# Patient Record
Sex: FEMALE | Race: WHITE | NOT HISPANIC OR LATINO | Employment: FULL TIME | URBAN - METROPOLITAN AREA
[De-identification: names, ages, dates, MRNs, and addresses within clinical notes are randomized per-mention and may not be internally consistent; named-entity substitution may affect disease eponyms.]

---

## 2023-03-23 LAB
CHLAMYDIA TRACH., AMPLIFIED: NEGATIVE
CLUE CELLS: NORMAL
N. GONORRHEA, AMPLIFIED: NEGATIVE
NUGENT SCORE: 1
YEAST: NORMAL

## 2023-06-15 LAB
BASOPHILS (10*3/UL) IN BLOOD BY AUTOMATED COUNT: 0.1 X10E9/L (ref 0–0.1)
BASOPHILS/100 LEUKOCYTES IN BLOOD BY AUTOMATED COUNT: 1 % (ref 0–2)
EOSINOPHILS (10*3/UL) IN BLOOD BY AUTOMATED COUNT: 0.58 X10E9/L (ref 0–0.7)
EOSINOPHILS/100 LEUKOCYTES IN BLOOD BY AUTOMATED COUNT: 6 % (ref 0–6)
ERYTHROCYTE DISTRIBUTION WIDTH (RATIO) BY AUTOMATED COUNT: 12.1 % (ref 11.5–14.5)
ERYTHROCYTE MEAN CORPUSCULAR HEMOGLOBIN CONCENTRATION (G/DL) BY AUTOMATED: 33.7 G/DL (ref 32–36)
ERYTHROCYTE MEAN CORPUSCULAR VOLUME (FL) BY AUTOMATED COUNT: 85 FL (ref 80–100)
ERYTHROCYTES (10*6/UL) IN BLOOD BY AUTOMATED COUNT: 4.8 X10E12/L (ref 4–5.2)
FERRITIN (UG/LL) IN SER/PLAS: 21 UG/L (ref 8–150)
HEMATOCRIT (%) IN BLOOD BY AUTOMATED COUNT: 40.6 % (ref 36–46)
HEMOGLOBIN (G/DL) IN BLOOD: 13.7 G/DL (ref 12–16)
IMMATURE GRANULOCYTES/100 LEUKOCYTES IN BLOOD BY AUTOMATED COUNT: 0.2 % (ref 0–0.9)
IRON (UG/DL) IN SER/PLAS: 114 UG/DL (ref 35–150)
IRON BINDING CAPACITY (UG/DL) IN SER/PLAS: 385 UG/DL (ref 240–445)
IRON SATURATION (%) IN SER/PLAS: 30 % (ref 25–45)
LEUKOCYTES (10*3/UL) IN BLOOD BY AUTOMATED COUNT: 9.6 X10E9/L (ref 4.4–11.3)
LYMPHOCYTES (10*3/UL) IN BLOOD BY AUTOMATED COUNT: 2.13 X10E9/L (ref 1.2–4.8)
LYMPHOCYTES/100 LEUKOCYTES IN BLOOD BY AUTOMATED COUNT: 22.2 % (ref 13–44)
MONOCYTES (10*3/UL) IN BLOOD BY AUTOMATED COUNT: 0.73 X10E9/L (ref 0.1–1)
MONOCYTES/100 LEUKOCYTES IN BLOOD BY AUTOMATED COUNT: 7.6 % (ref 2–10)
NEUTROPHILS (10*3/UL) IN BLOOD BY AUTOMATED COUNT: 6.03 X10E9/L (ref 1.2–7.7)
NEUTROPHILS/100 LEUKOCYTES IN BLOOD BY AUTOMATED COUNT: 63 % (ref 40–80)
PLATELETS (10*3/UL) IN BLOOD AUTOMATED COUNT: 329 X10E9/L (ref 150–450)

## 2023-06-16 LAB
DEAMIDATED GLIADIN PEPTIDE IGA: <1 U/ML (ref 0–14)
DEAMIDATED GLIADIN PEPTIDE IGG: <1 U/ML (ref 0–14)
HEPATITIS A VIRUS IGM AB PRESENCE IN SER/PLAS BY IMMUNOASSAY: NONREACTIVE
HEPATITIS B VIRUS CORE IGM AB PRESENCE IN SER/PLAS BY IMMUNOASSY: NONREACTIVE
HEPATITIS B VIRUS SURFACE AG PRESENCE IN SERUM: NONREACTIVE
HEPATITIS C VIRUS AB PRESENCE IN SERUM: NONREACTIVE
TISSUE TRANSGLUTAMINASE IGG: <1 U/ML (ref 0–14)
TISSUE TRANSGLUTAMINASE, IGA: <1 U/ML (ref 0–14)

## 2023-06-17 LAB
HEMOGLOBIN A2: 2.6 %
HEMOGLOBIN A: 96 %
HEMOGLOBIN F: 1.4 %
HEMOGLOBIN IDENTIFICATION INTERPRETATION: NORMAL
PATH REVIEW-HGB IDENTIFICATION: NORMAL

## 2023-06-19 LAB
ALBUMIN ELP: 5 G/DL (ref 3.4–5)
ALPHA 1: 0.3 G/DL (ref 0.2–0.6)
ALPHA 2: 0.7 G/DL (ref 0.4–1.1)
BETA: 0.8 G/DL (ref 0.5–1.2)
GAMMA GLOBULIN: 1.3 G/DL (ref 0.5–1.4)
PATH REVIEW-SERUM PROTEIN ELECTROPHORESIS: NORMAL
PROTEIN ELECTROPHORESIS INTERPRETATION: NORMAL
PROTEIN TOTAL: 8 G/DL (ref 6.4–8.2)

## 2023-09-25 LAB
ALANINE AMINOTRANSFERASE (SGPT) (U/L) IN SER/PLAS: 26 U/L (ref 7–45)
ALBUMIN (G/DL) IN SER/PLAS: 5 G/DL (ref 3.4–5)
ALKALINE PHOSPHATASE (U/L) IN SER/PLAS: 53 U/L (ref 33–110)
ANION GAP IN SER/PLAS: 11 MMOL/L (ref 10–20)
ASPARTATE AMINOTRANSFERASE (SGOT) (U/L) IN SER/PLAS: 19 U/L (ref 9–39)
BASOPHILS (10*3/UL) IN BLOOD BY AUTOMATED COUNT: 0.07 X10E9/L (ref 0–0.1)
BASOPHILS/100 LEUKOCYTES IN BLOOD BY AUTOMATED COUNT: 0.8 % (ref 0–2)
BILIRUBIN TOTAL (MG/DL) IN SER/PLAS: 0.6 MG/DL (ref 0–1.2)
CALCIUM (MG/DL) IN SER/PLAS: 9.9 MG/DL (ref 8.6–10.3)
CARBON DIOXIDE, TOTAL (MMOL/L) IN SER/PLAS: 32 MMOL/L (ref 21–32)
CHLORIDE (MMOL/L) IN SER/PLAS: 101 MMOL/L (ref 98–107)
CREATININE (MG/DL) IN SER/PLAS: 0.68 MG/DL (ref 0.5–1.05)
EOSINOPHILS (10*3/UL) IN BLOOD BY AUTOMATED COUNT: 0.28 X10E9/L (ref 0–0.7)
EOSINOPHILS/100 LEUKOCYTES IN BLOOD BY AUTOMATED COUNT: 3.3 % (ref 0–6)
ERYTHROCYTE DISTRIBUTION WIDTH (RATIO) BY AUTOMATED COUNT: 13.4 % (ref 11.5–14.5)
ERYTHROCYTE MEAN CORPUSCULAR HEMOGLOBIN CONCENTRATION (G/DL) BY AUTOMATED: 32.8 G/DL (ref 32–36)
ERYTHROCYTE MEAN CORPUSCULAR VOLUME (FL) BY AUTOMATED COUNT: 87 FL (ref 80–100)
ERYTHROCYTES (10*6/UL) IN BLOOD BY AUTOMATED COUNT: 4.91 X10E12/L (ref 4–5.2)
FERRITIN (UG/LL) IN SER/PLAS: 98 UG/L (ref 8–150)
GFR FEMALE: >90 ML/MIN/1.73M2
GLUCOSE (MG/DL) IN SER/PLAS: 87 MG/DL (ref 74–99)
HEMATOCRIT (%) IN BLOOD BY AUTOMATED COUNT: 42.7 % (ref 36–46)
HEMOGLOBIN (G/DL) IN BLOOD: 14 G/DL (ref 12–16)
HEMOGLOBIN (PG) IN RETICULOCYTES: 33 PG (ref 28–38)
IMMATURE GRANULOCYTES/100 LEUKOCYTES IN BLOOD BY AUTOMATED COUNT: 0.2 % (ref 0–0.9)
IMMATURE RETIC FRACTION: 4.9 % (ref 0–16)
IRON (UG/DL) IN SER/PLAS: 131 UG/DL (ref 35–150)
IRON BINDING CAPACITY (UG/DL) IN SER/PLAS: 329 UG/DL (ref 240–445)
IRON SATURATION (%) IN SER/PLAS: 40 % (ref 25–45)
LEUKOCYTES (10*3/UL) IN BLOOD BY AUTOMATED COUNT: 8.5 X10E9/L (ref 4.4–11.3)
LYMPHOCYTES (10*3/UL) IN BLOOD BY AUTOMATED COUNT: 3.02 X10E9/L (ref 1.2–4.8)
LYMPHOCYTES/100 LEUKOCYTES IN BLOOD BY AUTOMATED COUNT: 35.4 % (ref 13–44)
MONOCYTES (10*3/UL) IN BLOOD BY AUTOMATED COUNT: 0.56 X10E9/L (ref 0.1–1)
MONOCYTES/100 LEUKOCYTES IN BLOOD BY AUTOMATED COUNT: 6.6 % (ref 2–10)
NEUTROPHILS (10*3/UL) IN BLOOD BY AUTOMATED COUNT: 4.57 X10E9/L (ref 1.2–7.7)
NEUTROPHILS/100 LEUKOCYTES IN BLOOD BY AUTOMATED COUNT: 53.7 % (ref 40–80)
PLATELETS (10*3/UL) IN BLOOD AUTOMATED COUNT: 275 X10E9/L (ref 150–450)
POTASSIUM (MMOL/L) IN SER/PLAS: 3.5 MMOL/L (ref 3.5–5.3)
PROTEIN TOTAL: 8.1 G/DL (ref 6.4–8.2)
RETICULOCYTES (10*3/UL) IN BLOOD: 0.1 X10E12/L (ref 0.02–0.08)
RETICULOCYTES/100 ERYTHROCYTES IN BLOOD BY AUTOMATED COUNT: 2 % (ref 0.5–2)
SODIUM (MMOL/L) IN SER/PLAS: 140 MMOL/L (ref 136–145)
UREA NITROGEN (MG/DL) IN SER/PLAS: 10 MG/DL (ref 6–23)

## 2023-09-26 LAB
COBALAMIN (VITAMIN B12) (PG/ML) IN SER/PLAS: >2000 PG/ML (ref 211–911)
FOLATE (NG/ML) IN SER/PLAS: 16.6 NG/ML

## 2023-09-27 LAB — ERYTHROPOIETIN: 8 MU/ML (ref 4–27)

## 2023-10-06 PROBLEM — R10.813 RLQ ABDOMINAL TENDERNESS: Status: ACTIVE | Noted: 2023-10-06

## 2023-10-06 PROBLEM — R10.2 PELVIC PAIN IN FEMALE: Status: ACTIVE | Noted: 2023-10-06

## 2023-10-06 PROBLEM — S60.00XA: Status: ACTIVE | Noted: 2023-10-06

## 2023-10-06 RX ORDER — NORETHINDRONE 5 MG/1
1 TABLET ORAL DAILY
COMMUNITY
Start: 2020-02-20 | End: 2024-04-25 | Stop reason: WASHOUT

## 2023-10-06 RX ORDER — ONDANSETRON 4 MG/1
1 TABLET, FILM COATED ORAL EVERY 6 HOURS
COMMUNITY
Start: 2015-03-22

## 2023-10-06 RX ORDER — FAMOTIDINE 20 MG/1
TABLET, FILM COATED ORAL
COMMUNITY
Start: 2020-02-20

## 2023-10-06 RX ORDER — TRIAMCINOLONE ACETONIDE 5 MG/G
OINTMENT TOPICAL 2 TIMES DAILY
COMMUNITY
Start: 2020-02-20

## 2023-10-20 ENCOUNTER — LAB (OUTPATIENT)
Dept: LAB | Facility: LAB | Age: 23
End: 2023-10-20
Payer: COMMERCIAL

## 2023-10-20 DIAGNOSIS — D50.9 IRON DEFICIENCY ANEMIA, UNSPECIFIED IRON DEFICIENCY ANEMIA TYPE: ICD-10-CM

## 2023-10-20 DIAGNOSIS — K90.49 MALABSORPTION DUE TO INTOLERANCE, NOT ELSEWHERE CLASSIFIED: ICD-10-CM

## 2023-10-20 DIAGNOSIS — D50.8 IRON DEFICIENCY ANEMIA SECONDARY TO INADEQUATE DIETARY IRON INTAKE: ICD-10-CM

## 2023-10-20 DIAGNOSIS — K29.40 AUTOIMMUNE GASTRITIS: ICD-10-CM

## 2023-10-20 LAB
ALBUMIN SERPL BCP-MCNC: 4.8 G/DL (ref 3.4–5)
ALP SERPL-CCNC: 56 U/L (ref 33–110)
ALT SERPL W P-5'-P-CCNC: 23 U/L (ref 7–45)
ANION GAP SERPL CALC-SCNC: 11 MMOL/L (ref 10–20)
AST SERPL W P-5'-P-CCNC: 22 U/L (ref 9–39)
BASOPHILS # BLD AUTO: 0.07 X10*3/UL (ref 0–0.1)
BASOPHILS NFR BLD AUTO: 0.9 %
BILIRUB SERPL-MCNC: 0.6 MG/DL (ref 0–1.2)
BUN SERPL-MCNC: 11 MG/DL (ref 6–23)
CALCIUM SERPL-MCNC: 9.6 MG/DL (ref 8.6–10.3)
CHLORIDE SERPL-SCNC: 101 MMOL/L (ref 98–107)
CO2 SERPL-SCNC: 30 MMOL/L (ref 21–32)
CREAT SERPL-MCNC: 0.63 MG/DL (ref 0.5–1.05)
EOSINOPHIL # BLD AUTO: 0.29 X10*3/UL (ref 0–0.7)
EOSINOPHIL NFR BLD AUTO: 3.7 %
ERYTHROCYTE [DISTWIDTH] IN BLOOD BY AUTOMATED COUNT: 12.9 % (ref 11.5–14.5)
FERRITIN SERPL-MCNC: 76 NG/ML (ref 8–150)
GFR SERPL CREATININE-BSD FRML MDRD: >90 ML/MIN/1.73M*2
GLUCOSE SERPL-MCNC: 91 MG/DL (ref 74–99)
HCT VFR BLD AUTO: 40.3 % (ref 36–46)
HGB BLD-MCNC: 13.3 G/DL (ref 12–16)
IMM GRANULOCYTES # BLD AUTO: 0.07 X10*3/UL (ref 0–0.7)
IMM GRANULOCYTES NFR BLD AUTO: 0.1 % (ref 0–0.9)
IRON SATN MFR SERPL: 22 % (ref 25–45)
IRON SERPL-MCNC: 71 UG/DL (ref 35–150)
LYMPHOCYTES # BLD AUTO: 2.38 X10*3/UL (ref 1.2–4.8)
LYMPHOCYTES NFR BLD AUTO: 30.3 %
MCHC RBC AUTO-ENTMCNC: 33 G/DL (ref 32–36)
MCV RBC AUTO: 87 FL (ref 80–100)
MONOCYTES # BLD AUTO: 0.48 X10*3/UL (ref 0.1–1)
MONOCYTES NFR BLD AUTO: 6.1 %
NEUTROPHILS # BLD AUTO: 4.57 X10*3/UL (ref 1.2–7.7)
NEUTROPHILS NFR BLD AUTO: 58.1 %
PLATELET # BLD AUTO: 287 X10*3/UL (ref 150–450)
POTASSIUM SERPL-SCNC: 3.9 MMOL/L (ref 3.5–5.3)
PROT SERPL-MCNC: 7.6 G/DL (ref 6.4–8.2)
RBC # BLD AUTO: 4.63 X10*6/UL (ref 4–5.2)
SODIUM SERPL-SCNC: 138 MMOL/L (ref 136–145)
TIBC SERPL-MCNC: 320 UG/DL (ref 240–445)
UIBC SERPL-MCNC: 249 UG/DL (ref 110–370)
WBC # BLD AUTO: 7.9 X10*3/UL (ref 4.4–11.3)

## 2023-10-20 PROCEDURE — 82607 VITAMIN B-12: CPT

## 2023-10-20 PROCEDURE — 80053 COMPREHEN METABOLIC PANEL: CPT

## 2023-10-20 PROCEDURE — 83021 HEMOGLOBIN CHROMOTOGRAPHY: CPT

## 2023-10-20 PROCEDURE — 83020 HEMOGLOBIN ELECTROPHORESIS: CPT

## 2023-10-20 PROCEDURE — 82728 ASSAY OF FERRITIN: CPT

## 2023-10-20 PROCEDURE — 85025 COMPLETE CBC W/AUTO DIFF WBC: CPT

## 2023-10-20 PROCEDURE — 83540 ASSAY OF IRON: CPT

## 2023-10-20 PROCEDURE — 36415 COLL VENOUS BLD VENIPUNCTURE: CPT

## 2023-10-21 LAB — VIT B12 SERPL-MCNC: >2000 PG/ML (ref 211–911)

## 2023-10-22 PROBLEM — K90.49 MALABSORPTION DUE TO INTOLERANCE, NOT ELSEWHERE CLASSIFIED: Status: ACTIVE | Noted: 2023-10-22

## 2023-10-22 PROBLEM — D50.8 IRON DEFICIENCY ANEMIA SECONDARY TO INADEQUATE DIETARY IRON INTAKE: Status: ACTIVE | Noted: 2023-10-22

## 2023-10-22 PROBLEM — K29.40 AUTOIMMUNE GASTRITIS: Status: ACTIVE | Noted: 2023-10-22

## 2023-10-22 RX ORDER — HEPARIN SODIUM,PORCINE/PF 10 UNIT/ML
50 SYRINGE (ML) INTRAVENOUS AS NEEDED
Status: CANCELLED | OUTPATIENT
Start: 2023-10-23

## 2023-10-22 RX ORDER — DIPHENHYDRAMINE HYDROCHLORIDE 50 MG/ML
50 INJECTION INTRAMUSCULAR; INTRAVENOUS AS NEEDED
Status: CANCELLED | OUTPATIENT
Start: 2023-10-23

## 2023-10-22 RX ORDER — EPINEPHRINE 0.3 MG/.3ML
0.3 INJECTION SUBCUTANEOUS EVERY 5 MIN PRN
Status: CANCELLED | OUTPATIENT
Start: 2023-10-23

## 2023-10-22 RX ORDER — FAMOTIDINE 10 MG/ML
20 INJECTION INTRAVENOUS ONCE AS NEEDED
Status: CANCELLED | OUTPATIENT
Start: 2023-10-23

## 2023-10-22 RX ORDER — ALBUTEROL SULFATE 0.83 MG/ML
3 SOLUTION RESPIRATORY (INHALATION) AS NEEDED
Status: CANCELLED | OUTPATIENT
Start: 2023-10-23

## 2023-10-22 RX ORDER — HEPARIN 100 UNIT/ML
500 SYRINGE INTRAVENOUS AS NEEDED
Status: CANCELLED | OUTPATIENT
Start: 2023-10-23

## 2023-10-23 ENCOUNTER — OFFICE VISIT (OUTPATIENT)
Dept: HEMATOLOGY/ONCOLOGY | Facility: HOSPITAL | Age: 23
End: 2023-10-23
Payer: COMMERCIAL

## 2023-10-23 DIAGNOSIS — K29.40 AUTOIMMUNE GASTRITIS: Primary | ICD-10-CM

## 2023-10-23 DIAGNOSIS — D50.8 IRON DEFICIENCY ANEMIA SECONDARY TO INADEQUATE DIETARY IRON INTAKE: ICD-10-CM

## 2023-10-23 DIAGNOSIS — K90.49 MALABSORPTION DUE TO INTOLERANCE, NOT ELSEWHERE CLASSIFIED: ICD-10-CM

## 2023-10-23 PROCEDURE — 99214 OFFICE O/P EST MOD 30 MIN: CPT

## 2023-10-23 NOTE — PROGRESS NOTES
Patient Visit Information:   Visit Type: Benign Heme Follow-up, Virtual Visit:   An interactive audio and video telecommunication system which permits real time communications between the patient (at the originating site) and provider (at the distant site) was utilized to provide this telehealth service.      Verbal Consent for Encounter: Verbal consent was  requested and obtained from patient, or from parent/guardian if minor, on this date for a telehealth visit.       History of Present Illness:      ID Statement:    RUFINA EDWARDS is a 23 year old Female        Chief Complaint: Follow-up regarding anemia and B12  deficiency   Interval History:    Patient is a 23-year-old  female who presents for telephone follow-up visit regarding B12 deficiency and anemia.  Family history of thalassemia and personal history of low ferritin.   Patient has had a low ferritin since 2018, tried oral iron but had GI intolerance, 2019 EGD and colonoscopy with a polyp removed otherwise no findings and had a second colonoscopy shortly after that because of bleeding status post staples, no hx IV iron transfusions, no red meat since 2020 d/t intolerance, no pica, no diagnosis of celiac disease, no use of a PPI.  She has had in the toilet after bowel movements for the past several weeks, she relates to her hemorrhoids, new occurrence following gastroenterologist December 2023.  GI has treated her previously for this issue. Treatment for H. pylori March 2023 by nurse practitioner Bubab Contreras but had intolerance to the medication, also has strep throat treated March 2023.  She has finished her H. pylori treatments. Consult new GI this week for second opinion.   ER visit in April 2023 regarding anxiety, CT head no acute abnormality.   Patient has anxiety and depression, followed by psychiatry, mood doing okay right now although her medications may be adjusted due to dizziness.  She is on Zoloft and is concerned it is causing GI bleeding.   "She follows psychiatry.      She has heavy menses changing her feminine product every 2-3 hours, follows with gynecology who is considering endometriosis with further evaluation pending, took OCPs in the past without benefit.  Her last menstrual started 8 days ago and lasted 5 days. Chronic fluctuating diarrhea and constipation diagnosed with irritable bowel syndrome/disease, intermittent abdominal pain.  She has chronic sweats for years.  She has chronic headaches which are ongoing and being evaluated  by neurology with CT brain and neck 04/2023 normal. Saw a private neurologist did MRI was normal per her report.  She has seen a cardiologist because of some intermittent chest pain and palpitations.  She wore a heart monitor and had a echo and stress test all negative. Reports \"skipping beat still.\" Follow PRN.   She has chronic intermittent shortness of breath with activity.  She follows with an allergist/immunologist for history of multiple allergies. Recent 3 weeks ago, respiratory illness.  She saw vascular surgery for her Raynaud's ml, post covid, vascular response. Follows Hematology at [facility], labs in July 2023. Receives B12 injections weekly through them. Patient denies any fevers, lymphadenopathy, recent or recurrent infections except strep throat and H. pylori as above, edema, cough, dysuria or hematuria, pain or neurologic symptoms except as above, rashes or lumps, abnormal bleeding  except as above, diabetes or thyroid disorder.  Sore throat persists. ENT referral. She reports \"okay\" appetite, stable weight.   Drinks plenty of fluid.  Her main complaint is fatigue and loss of energy. PCP is Dr. Sid Staley, at [facility].        Past medical history  -Anemia   -April 2023 hematuria-Per patient no menses at the time  -Depression and anxiety  -H. pylori in March 2023   -Ruptured ovarian cyst seen on CT 3/2023, also hospitalized for this 11/2015. Gynecology discussed laparoscopy 2/2020 to evaluate for " endometriosis.  -Asthma  as a child  -Seasonal allergies  -Hypokalemia March 2023, 2019, 2015  -March/April 2023 strep throat   -Headaches   -Genital HSV     -Palpitations   -COVID 10/2021, 12/2021, 8/2022  Past surgical history  [place] teeth removal,  EGD, colonoscopy     Social history:  Patient lives with her mother, stepfather, sister, and boyfriend.   She denies ever smoking, no alcohol or drug abuse.     Family history:  Mom, living thalassemia -chronic anemia, hysterectomy due to heavy bleeding  No other known hematology or cancer diagnosis is in her family.       Review of Systems:   System Review-All other systems including Neurologic, Cardiac, Gastrointestinal, Endocrine, Dermatologic, ENT, Respiratory, Infectious, Urologic, Musculoskeletal  have been reviewed and are negative for complaint outside of events noted below and within the assessment.         · Constitutional Comments fatigue and loss energy     ·  Respiratory POSITIVE: Shortness of Breath at times      ·  Gastrointestinal POSITIVE: Abdominal Pain, Constipation, Diarrhea at times      ·  Neurological POSITIVE: Dizziness, Headache     Comments lightheadedness      ·  Psychiatric POSITIVE: Anxiety      ·  Endocrine POSITIVE: Cold Intolerance, Sweat      ·  Hematologic/Lymph POSITIVE: Anemia, Bruising, cold hands and feet      ·  Allergic/Immunologic POSITIVE: Itching, Sneezing, recent infection 3 weeks ago            Allergies and Intolerances:       Allergies:          Allergies   Allergen Reactions    Lorazepam Unknown    Milk Unknown    Morphine Unknown         Outpatient Medication Profile:  Current Outpatient Medications   Medication Instructions    famotidine (Pepcid) 20 mg tablet oral    norethindrone (Aygestin) 5 mg tablet 1 tablet, oral, Daily    ondansetron (Zofran) 4 mg tablet 1 tablet, oral, Every 6 hours    triamcinolone (Kenalog) 0.5 % ointment 2 times daily,  APPLY SPARINGLY TO AFFECTED AREA(S)                Medical History:          Intestinal malabsorption: ICD-10: K90.9, Status: Active         Iron deficiency: ICD-10: E61.1, Status: Active         Colon polyp: ICD-10: K63.5, Status: Active         Hematuria: ICD-10: R31.9, Status: Active         Weight loss: ICD-10: R63.4, Status: Active         Fatigue: ICD-10: R53.83, Status: Active         Elevated blood protein: ICD-10: R77.9, Status: Active         Leukocytosis: ICD-10: D72.829, Status: Active         Family history of thalassemia: ICD-10: Z83.2, Status: Active         Shortness of breath at rest: ICD-10: R06.02, Status: Active         Lactose intolerance: ICD-10: E73.9, Status: Active         Major depressive disorder: ICD-10: F32.9, Status: Active       Performance:   ECOG Performance Status: 0 Fully Active          Previous Vitals and Measurements:   Measurements: HT(cm): 165.5  WT(kg): 64.4  BSA: 1.72   BMI:  23.5   Last 3 Weights & Heights: Date:                           Weight/Scale Type:                    Height:   16-Aug-2023 10:34                64.4  kg                     165.5  cm  11-Aug-2023 12:47                63.4  kg                     165.5  cm  04-Aug-2023 12:33                63.7  kg                     165.5  cm         Recent Vital Signs:  Recent Vital Signs TEMP: 36.9 HR: 98 RR: 16 BP: 119/81 SPO2: 99  Date Documented: 16-Aug-2023 10:46:00          Lab Results:  Labs:  Lab Results   Component Value Date    WBC 7.9 10/20/2023    NEUTROABS 4.57 10/20/2023    IGABSOL 0.07 10/20/2023    LYMPHSABS 2.38 10/20/2023    MONOSABS 0.48 10/20/2023    EOSABS 0.29 10/20/2023    BASOSABS 0.07 10/20/2023    RBC 4.63 10/20/2023    MCV 87 10/20/2023    MCHC 33.0 10/20/2023    HGB 13.3 10/20/2023    HCT 40.3 10/20/2023     10/20/2023     Lab Results   Component Value Date    RETICCTPCT 2.0 09/25/2023      Lab Results   Component Value Date    CREATININE 0.63 10/20/2023    BUN 11 10/20/2023    EGFR >90 10/20/2023     10/20/2023    K 3.9 10/20/2023      10/20/2023    CO2 30 10/20/2023      Lab Results   Component Value Date    ALT 23 10/20/2023    AST 22 10/20/2023    ALKPHOS 56 10/20/2023    BILITOT 0.6 10/20/2023      Lab Results   Component Value Date    TSH 1.59 08/16/2023     Lab Results   Component Value Date    TSH 1.59 08/16/2023     Lab Results   Component Value Date    IRON 71 10/20/2023    TIBC 320 10/20/2023    FERRITIN 76 10/20/2023      Lab Results   Component Value Date    [id number] >2,000 (H) 10/20/2023      Lab Results   Component Value Date    FOLATE 16.6 09/25/2023     Lab Results   Component Value Date    SPEP NORMAL 06/15/2023     I have reviewed these laboratory results:     Comprehensive Metabolic Panel  Trending View       Result 16-Aug-2023 11:46:00  11-Jun-2023 21:16:00    Glucose, Serum 107   H   87       139    K 3.7   3.6       104    Bicarbonate, Serum 23   25    Anion Gap, Serum 12   14    BUN 13   14    CREAT 0.56   0.61    GFR Female >90   >90    Calcium, Serum 9.3   9.7    ALB 4.6   4.8    ALKP 54   55    T Pro 7.3   7.6    T Bili 0.5   0.4    Alanine Aminotransferase, Serum 15   10    Aspartate Transaminase, Serum 13   13        Complete Blood Count + Differential  Trending View       Result 16-Aug-2023 11:46:00  15-Piter-2023 15:32:00  11-Jun-2023 21:16:00    White Blood Cell Count 8.1   9.6   12.2   H    Red Blood Cell Count 4.30   4.80   4.49    HGB 12.3   13.7   12.5    HCT 36.5   40.6   37.7    MCV 85   85   84    MCHC 33.7   33.7   33.2       329   300    RDW-CV 14.0   12.1   12.0    Neutrophil % 69.7   63.0   64.8    Immature Granulocytes % 0.4   0.2   0.2    Lymphocyte % 20.4   22.2   23.9    Monocyte % 6.8   7.6   6.7    Eosinophil % 2.2   6.0   3.7    Basophil % 0.5   1.0   0.7    Neutrophil Count 5.67   6.03   7.89   H    Lymphocyte Count 1.66   2.13   2.91    Monocyte Count 0.55   0.73   0.82    Eosinophil Count 0.18   0.58   0.45    Basophil Count 0.04   0.10   0.09        Iron + TIBC, Serum   Trending View       Result 16-Aug-2023 11:46:00  15-Piter-2023 15:32:00    Iron, Serum 68   114    Total Iron Binding Capacity 323   385    % Saturation 21   L   30        TSH with Reflex to Free T4 if Abnormal  Trending View       Result 16-Aug-2023 11:46:00  11-Jun-2023 21:16:00    Thyroid Stimulating Hormone, Serum 1.59   2.15        Ferritin, Serum  Trending View       Result 16-Aug-2023 11:46:00  15-Piter-2023 15:32:00    Ferritin, Serum 73   21        Folate, Serum  16-Aug-2023 11:46:00       Result Value    Folate, Serum  >24.0       Vitamin B12, Serum  16-Aug-2023 11:46:00       Result Value    Vitamin B12, Serum  >2000   A      Celiac Disease Serology Panel  15-Piter-2023 15:32:00       Result Value    Tissue Transglutaminase Ab (tTG), IgA [with assessment of Total IgA]  <1    Tissue Transglutaminase Ab, IgG  <1    Deamidated Gliadin Peptide, IgA [with assessment of Total IgA]  <1    Deamidated Gliadin Peptide, IgG  <1       Hepatitis Panel, Acute (HCFA)  15-Piter-2023 15:32:00       Result Value    Hepatitis A IgM Antibody  NONREACTIVE    Reference Range: NONREACTIVE Biotin interference may cause falsely decreased results.   Patients taking a Biotin dose of up to 5 mg/day should   refrain from taking Biotin for 24 hours before sample   collection. Providers may contact t    Hepatitis B Core Ab, IgM  NONREACTIVE    Reference Range: NONREACTIVE Results from patients taking biotin supplements or receiving   high-dose biotin therapy should be interpreted with caution   due to possible interference with this test. Providers may   contact their local l    Hep.B Surface Ag  NONREACTIVE    Reference Range: NONREACTIVE Biotin interference may cause falsely decreased results.   Patients taking a Biotin dose of up to 5 mg/day should   refrain from taking Biotin for 24 hours before sample   collection. Providers may contact t    Hepatitis C-Antibody  NONREACTIVE    Reference Range: NONREACTIVE Results from patients taking  biotin supplements or receiving   high-dose biotin therapy should be interpreted with caution   due to possible interference with this test. Providers may    contact their local       Hemoglobin Identification  15-Piter-2023 15:32:00       Result Value    Hemoglobin A, Serum  96.0    Hemoglobin F  1.4    Hemoglobin A2, Serum  2.6    Interpretation  SEE COMMENT SEE COMMENT   Normal       Path Review SPE  15-Piter-2023 15:32:00       Result Value    Path Review-SPE  JIGAR By her/his signature above, the Pathologist   listed as making the final interpretation   certifies that she/he has personally reviewed    this case.  ----------------------------------------------       Protein Electrophoresis, Serum  15-Piter-2023 15:32:00       Result Value    T Pro  8.0    Albumin Serum  5.0    Alpha 1 Globulin  0.3    Alpha 2 Globulin  0.7    Beta Globulin  0.8    Gamma Globulin  1.3    Interpretation, Electrophoresis  NORMAL       Path Review-HGB Identification  15-Piter-2023 15:32:00       Result Value    Path Rev-HGB Identification  KYUNG FAROOQ By her/his signature above, the Pathologist   listed as making the final interpretation   certifies that she/he has personally reviewed    this case.  ----------------------------------------------       Urinalysis  11-Jun-2023 22:25:00       Result Value    Color, Urine  STRAW    Reference Range: STRAW,YELLOW    Appearance, Urine  CLEAR    Specific Gravity, Urine  1.011    pH, Urine  8.0    Protein, Urine  NEGATIVE    Glucose, Urine  NEGATIVE    Blood, Urine  NEGATIVE    Ketones, Urine  5(Trace)   A   Bilirubin, Urine  NEGATIVE    Urobilinogen, Urine  <2.0    Nitrite, Urine  NEGATIVE    Leukocyte Esterase, Urine  NEGATIVE       Heterophile Antibody  11-Jun-2023 21:16:00       Result Value    Heterophile Ab  NEGATIVE       Lactate, Level  11-Jun-2023 21:16:00       Result Value    Lactate, Level  0.6       HCG, Beta Quantitative  11-Jun-2023 21:16:00       Result Value    HCG, Beta  Quantitative  <2          Radiology Result:     ·  Results           Impression:        No findings of an acute cardiopulmonary process.         Signed by Agustin Rodriguez MD      Xray Chest 1 View [Jun 11 2023  9:54PM]        Impression:     No CT evidence of acute intracranialpathology.         CT Head without Contrast [Apr 2 2023  5:56PM]        Impression:     1. Ruptured right ovarian follicle.  2. Small to moderate amount of free fluid in the cul-de-sac.  3. Normal appendix.  4. No CT evidence of acute pancreatitis.      CT Abdomen and Pelvis with IV Contrast [Mar 24 2023  4:49PM]        Assessment and Plan:      Patient presents today via virtual for fatigue and review recent labs.  Recent infusions over last 2 months. Iron TSAT 22 today. Reports heavy mensis the last 2 months.  Additional new diagnosis of autoimmune gastritis. Following GI. She is receiving B12 injections through [facility]. Labs were also drawn in July 2023 during that visit.  [facility] [place].  B12 level is over 2000, advised to discuss reducing weekly injections.   Referral to ENT for persistent sore throat.  Advised her to follow-up with psychiatry regarding the new Zoloft administration and her concern for the side effect of bleeding.  Encouraged her to continue healthy diet and hydration.          Follow-up:     RTC:  -- IV iron ordered  --- labs in 4 weeks following last iron infusion  --In person with labs 3 months     Medication:  -- Venofer IV iron x 3       Referral:  -- GI for bleeding hemorrhoids  -- ENT for sore throat     Discussed labs in detail, answered questions addressed concerns, she agrees with plan of care.  We will discuss further plan of care at following appointment.  She will call with any symptoms.       Patient Instructions:      Instructions Type: patient education, nutrition,  lifestyle, guidance and counseling

## 2023-10-24 ENCOUNTER — TELEPHONE (OUTPATIENT)
Dept: HEMATOLOGY/ONCOLOGY | Facility: HOSPITAL | Age: 23
End: 2023-10-24
Payer: COMMERCIAL

## 2023-10-24 NOTE — TELEPHONE ENCOUNTER
Patient saw NPP on 10/23/23. Unable to schedule patient at that time due to being out of office. Called patient to schedule iron infusions x3 and 3 month follow up with NPP. At patient's request, scheduled iron infusions starting the 2nd week of November. Scheduled for 11/13, 11/17 and 11/24 at 12 PM. 3 month follow up scheduled for Monday, 1/29/24 at 1 PM for phone visit. Informed patient of lab work needed 4 weeks after last iron infusion and one week prior to follow up with NPP. Patient verbalized and agreed to everything above.

## 2023-10-26 LAB
HEMOGLOBIN A2: 2.7 % (ref 2–3.5)
HEMOGLOBIN A: 97 % (ref 95.8–98)
HEMOGLOBIN F: 0.3 % (ref 0–2)
HEMOGLOBIN IDENTIFICATION INTERPRETATION: NORMAL
PATH REVIEW-HGB IDENTIFICATION: NORMAL

## 2023-11-06 ENCOUNTER — INFUSION (OUTPATIENT)
Dept: HEMATOLOGY/ONCOLOGY | Facility: HOSPITAL | Age: 23
End: 2023-11-06
Payer: COMMERCIAL

## 2023-11-06 VITALS
DIASTOLIC BLOOD PRESSURE: 56 MMHG | SYSTOLIC BLOOD PRESSURE: 125 MMHG | TEMPERATURE: 98.2 F | OXYGEN SATURATION: 98 % | WEIGHT: 163.14 LBS | BODY MASS INDEX: 26.33 KG/M2 | RESPIRATION RATE: 16 BRPM | HEART RATE: 74 BPM

## 2023-11-06 DIAGNOSIS — D50.8 IRON DEFICIENCY ANEMIA SECONDARY TO INADEQUATE DIETARY IRON INTAKE: Primary | ICD-10-CM

## 2023-11-06 DIAGNOSIS — D50.8 IRON DEFICIENCY ANEMIA SECONDARY TO INADEQUATE DIETARY IRON INTAKE: ICD-10-CM

## 2023-11-06 DIAGNOSIS — K29.40 AUTOIMMUNE GASTRITIS: ICD-10-CM

## 2023-11-06 DIAGNOSIS — K90.49 MALABSORPTION DUE TO INTOLERANCE, NOT ELSEWHERE CLASSIFIED: ICD-10-CM

## 2023-11-06 PROCEDURE — 2500000004 HC RX 250 GENERAL PHARMACY W/ HCPCS (ALT 636 FOR OP/ED)

## 2023-11-06 PROCEDURE — 96365 THER/PROPH/DIAG IV INF INIT: CPT | Mod: INF

## 2023-11-06 RX ORDER — HEPARIN 100 UNIT/ML
500 SYRINGE INTRAVENOUS AS NEEDED
OUTPATIENT
Start: 2023-11-06

## 2023-11-06 RX ORDER — ALBUTEROL SULFATE 0.83 MG/ML
3 SOLUTION RESPIRATORY (INHALATION) AS NEEDED
Status: CANCELLED | OUTPATIENT
Start: 2023-11-13

## 2023-11-06 RX ORDER — HEPARIN SODIUM,PORCINE/PF 10 UNIT/ML
50 SYRINGE (ML) INTRAVENOUS AS NEEDED
OUTPATIENT
Start: 2023-11-06

## 2023-11-06 RX ORDER — DIPHENHYDRAMINE HYDROCHLORIDE 50 MG/ML
50 INJECTION INTRAMUSCULAR; INTRAVENOUS AS NEEDED
Status: CANCELLED | OUTPATIENT
Start: 2023-11-13

## 2023-11-06 RX ORDER — EPINEPHRINE 0.3 MG/.3ML
0.3 INJECTION SUBCUTANEOUS EVERY 5 MIN PRN
Status: CANCELLED | OUTPATIENT
Start: 2023-11-13

## 2023-11-06 RX ORDER — FAMOTIDINE 10 MG/ML
20 INJECTION INTRAVENOUS ONCE AS NEEDED
Status: CANCELLED | OUTPATIENT
Start: 2023-11-13

## 2023-11-06 RX ADMIN — IRON SUCROSE 300 MG: 20 INJECTION, SOLUTION INTRAVENOUS at 13:56

## 2023-11-06 ASSESSMENT — PATIENT HEALTH QUESTIONNAIRE - PHQ9
1. LITTLE INTEREST OR PLEASURE IN DOING THINGS: NOT AT ALL
2. FEELING DOWN, DEPRESSED OR HOPELESS: NOT AT ALL
SUM OF ALL RESPONSES TO PHQ9 QUESTIONS 1 AND 2: 0

## 2023-11-06 ASSESSMENT — COLUMBIA-SUICIDE SEVERITY RATING SCALE - C-SSRS
1. IN THE PAST MONTH, HAVE YOU WISHED YOU WERE DEAD OR WISHED YOU COULD GO TO SLEEP AND NOT WAKE UP?: NO
2. HAVE YOU ACTUALLY HAD ANY THOUGHTS OF KILLING YOURSELF?: NO
6. HAVE YOU EVER DONE ANYTHING, STARTED TO DO ANYTHING, OR PREPARED TO DO ANYTHING TO END YOUR LIFE?: NO

## 2023-11-06 ASSESSMENT — PAIN SCALES - GENERAL: PAINLEVEL: 0-NO PAIN

## 2023-11-13 ENCOUNTER — TELEPHONE (OUTPATIENT)
Dept: HEMATOLOGY/ONCOLOGY | Facility: HOSPITAL | Age: 23
End: 2023-11-13
Payer: COMMERCIAL

## 2023-11-13 ENCOUNTER — APPOINTMENT (OUTPATIENT)
Dept: HEMATOLOGY/ONCOLOGY | Facility: HOSPITAL | Age: 23
End: 2023-11-13
Payer: COMMERCIAL

## 2023-11-13 NOTE — TELEPHONE ENCOUNTER
Attempted to reach patient regarding her infusion for today. Left patient a message to call back at earliest convenience.

## 2023-11-17 ENCOUNTER — APPOINTMENT (OUTPATIENT)
Dept: HEMATOLOGY/ONCOLOGY | Facility: HOSPITAL | Age: 23
End: 2023-11-17
Payer: COMMERCIAL

## 2023-11-17 ENCOUNTER — TELEPHONE (OUTPATIENT)
Dept: HEMATOLOGY/ONCOLOGY | Facility: HOSPITAL | Age: 23
End: 2023-11-17
Payer: COMMERCIAL

## 2023-11-17 NOTE — TELEPHONE ENCOUNTER
Patient called to get both appointments that she missed rescheduled. Patient is rescheduled for 11/27/23 and 12/4/23. Patient verbalized and agreed to appointment.

## 2023-11-17 NOTE — TELEPHONE ENCOUNTER
Attempted to reach patient regarding her appointment that was scheduled today @ 11:00 am. Left a message for patient to call back at her earliest convenience.

## 2023-11-23 DIAGNOSIS — K90.49 MALABSORPTION DUE TO INTOLERANCE, NOT ELSEWHERE CLASSIFIED: ICD-10-CM

## 2023-11-23 DIAGNOSIS — K29.40 AUTOIMMUNE GASTRITIS: Primary | ICD-10-CM

## 2023-11-23 DIAGNOSIS — D50.8 IRON DEFICIENCY ANEMIA SECONDARY TO INADEQUATE DIETARY IRON INTAKE: ICD-10-CM

## 2023-11-23 RX ORDER — EPINEPHRINE 0.3 MG/.3ML
0.3 INJECTION SUBCUTANEOUS EVERY 5 MIN PRN
OUTPATIENT
Start: 2023-11-27

## 2023-11-23 RX ORDER — DIPHENHYDRAMINE HYDROCHLORIDE 50 MG/ML
50 INJECTION INTRAMUSCULAR; INTRAVENOUS ONCE
Start: 2023-11-27 | End: 2023-11-27

## 2023-11-23 RX ORDER — FAMOTIDINE 10 MG/ML
20 INJECTION INTRAVENOUS ONCE AS NEEDED
OUTPATIENT
Start: 2023-11-27

## 2023-11-23 RX ORDER — ALBUTEROL SULFATE 0.83 MG/ML
3 SOLUTION RESPIRATORY (INHALATION) AS NEEDED
OUTPATIENT
Start: 2023-11-27

## 2023-11-23 RX ORDER — DIPHENHYDRAMINE HYDROCHLORIDE 50 MG/ML
50 INJECTION INTRAMUSCULAR; INTRAVENOUS ONCE
Status: CANCELLED
Start: 2023-11-23 | End: 2023-11-23

## 2023-11-23 RX ORDER — DIPHENHYDRAMINE HYDROCHLORIDE 50 MG/ML
50 INJECTION INTRAMUSCULAR; INTRAVENOUS AS NEEDED
OUTPATIENT
Start: 2023-11-27

## 2023-11-24 ENCOUNTER — APPOINTMENT (OUTPATIENT)
Dept: HEMATOLOGY/ONCOLOGY | Facility: HOSPITAL | Age: 23
End: 2023-11-24
Payer: COMMERCIAL

## 2023-12-04 ENCOUNTER — TELEPHONE (OUTPATIENT)
Dept: HEMATOLOGY/ONCOLOGY | Facility: HOSPITAL | Age: 23
End: 2023-12-04
Payer: COMMERCIAL

## 2023-12-04 ENCOUNTER — APPOINTMENT (OUTPATIENT)
Dept: HEMATOLOGY/ONCOLOGY | Facility: HOSPITAL | Age: 23
End: 2023-12-04
Payer: COMMERCIAL

## 2023-12-04 NOTE — TELEPHONE ENCOUNTER
Attempted to reach patient today regarding her appointment today due to being a no show. Unable to leave message for patient.

## 2023-12-04 NOTE — PROGRESS NOTES
No call/no show for iron infusion appointment. Per pt registration, today's no show appointment pollard the 7th cancellation since September 2023 (3 month period). OK Hutton CNP aware.

## 2023-12-14 ENCOUNTER — TELEPHONE (OUTPATIENT)
Dept: HEMATOLOGY/ONCOLOGY | Facility: HOSPITAL | Age: 23
End: 2023-12-14
Payer: COMMERCIAL

## 2023-12-14 NOTE — TELEPHONE ENCOUNTER
Attempted to reach patient regarding her upcoming appointment with GUADALUPE Rooney. Left patient a message to call back at her earliest convenience.

## 2023-12-18 ENCOUNTER — TELEPHONE (OUTPATIENT)
Dept: HEMATOLOGY/ONCOLOGY | Facility: HOSPITAL | Age: 23
End: 2023-12-18
Payer: COMMERCIAL

## 2023-12-18 NOTE — TELEPHONE ENCOUNTER
Attempted to reach patient again regarding her iron infusions that need to be scheduled. Also patient has a follow up appointment with GUADALUPE Rooney in January that needs changed due to clinic day changes. Left message for patient to call back at her earliest convenience.

## 2024-01-12 ENCOUNTER — LAB (OUTPATIENT)
Dept: LAB | Facility: LAB | Age: 24
End: 2024-01-12
Payer: COMMERCIAL

## 2024-01-12 DIAGNOSIS — D50.8 IRON DEFICIENCY ANEMIA SECONDARY TO INADEQUATE DIETARY IRON INTAKE: ICD-10-CM

## 2024-01-12 DIAGNOSIS — K90.49 MALABSORPTION DUE TO INTOLERANCE, NOT ELSEWHERE CLASSIFIED: ICD-10-CM

## 2024-01-12 LAB
BASOPHILS # BLD AUTO: 0.09 X10*3/UL (ref 0–0.1)
BASOPHILS NFR BLD AUTO: 1 %
EOSINOPHIL # BLD AUTO: 0.37 X10*3/UL (ref 0–0.7)
EOSINOPHIL NFR BLD AUTO: 4.1 %
ERYTHROCYTE [DISTWIDTH] IN BLOOD BY AUTOMATED COUNT: 11.9 % (ref 11.5–14.5)
FERRITIN SERPL-MCNC: 87 NG/ML (ref 8–150)
HCT VFR BLD AUTO: 42.1 % (ref 36–46)
HGB BLD-MCNC: 14.1 G/DL (ref 12–16)
IMM GRANULOCYTES # BLD AUTO: 0.03 X10*3/UL (ref 0–0.7)
IMM GRANULOCYTES NFR BLD AUTO: 0.3 % (ref 0–0.9)
IRON SATN MFR SERPL: 40 % (ref 25–45)
IRON SERPL-MCNC: 125 UG/DL (ref 35–150)
LYMPHOCYTES # BLD AUTO: 3.11 X10*3/UL (ref 1.2–4.8)
LYMPHOCYTES NFR BLD AUTO: 34.1 %
MCH RBC QN AUTO: 29.3 PG (ref 26–34)
MCHC RBC AUTO-ENTMCNC: 33.5 G/DL (ref 32–36)
MCV RBC AUTO: 87 FL (ref 80–100)
MONOCYTES # BLD AUTO: 0.56 X10*3/UL (ref 0.1–1)
MONOCYTES NFR BLD AUTO: 6.1 %
NEUTROPHILS # BLD AUTO: 4.97 X10*3/UL (ref 1.2–7.7)
NEUTROPHILS NFR BLD AUTO: 54.4 %
NRBC BLD-RTO: 0 /100 WBCS (ref 0–0)
PLATELET # BLD AUTO: 316 X10*3/UL (ref 150–450)
RBC # BLD AUTO: 4.82 X10*6/UL (ref 4–5.2)
TIBC SERPL-MCNC: 310 UG/DL (ref 240–445)
UIBC SERPL-MCNC: 185 UG/DL (ref 110–370)
WBC # BLD AUTO: 9.1 X10*3/UL (ref 4.4–11.3)

## 2024-01-12 PROCEDURE — 85025 COMPLETE CBC W/AUTO DIFF WBC: CPT

## 2024-01-12 PROCEDURE — 82728 ASSAY OF FERRITIN: CPT

## 2024-01-12 PROCEDURE — 83540 ASSAY OF IRON: CPT

## 2024-01-12 PROCEDURE — 36415 COLL VENOUS BLD VENIPUNCTURE: CPT

## 2024-01-12 PROCEDURE — 83550 IRON BINDING TEST: CPT

## 2024-01-15 ENCOUNTER — HOSPITAL ENCOUNTER (EMERGENCY)
Facility: HOSPITAL | Age: 24
Discharge: HOME | End: 2024-01-15
Attending: STUDENT IN AN ORGANIZED HEALTH CARE EDUCATION/TRAINING PROGRAM
Payer: COMMERCIAL

## 2024-01-15 ENCOUNTER — ANCILLARY PROCEDURE (OUTPATIENT)
Dept: RADIOLOGY | Facility: HOSPITAL | Age: 24
End: 2024-01-15
Payer: COMMERCIAL

## 2024-01-15 VITALS
TEMPERATURE: 98.2 F | RESPIRATION RATE: 18 BRPM | HEIGHT: 66 IN | HEART RATE: 86 BPM | OXYGEN SATURATION: 99 % | WEIGHT: 160 LBS | SYSTOLIC BLOOD PRESSURE: 124 MMHG | DIASTOLIC BLOOD PRESSURE: 83 MMHG | BODY MASS INDEX: 25.71 KG/M2

## 2024-01-15 DIAGNOSIS — N89.8 VAGINAL DISCHARGE: Primary | ICD-10-CM

## 2024-01-15 LAB
ALBUMIN SERPL BCP-MCNC: 5 G/DL (ref 3.4–5)
ALP SERPL-CCNC: 66 U/L (ref 33–110)
ALT SERPL W P-5'-P-CCNC: 12 U/L (ref 7–45)
ANION GAP SERPL CALC-SCNC: 16 MMOL/L (ref 10–20)
APPEARANCE UR: CLEAR
AST SERPL W P-5'-P-CCNC: 14 U/L (ref 9–39)
BACTERIA #/AREA URNS AUTO: ABNORMAL /HPF
BASOPHILS # BLD AUTO: 0.08 X10*3/UL (ref 0–0.1)
BASOPHILS NFR BLD AUTO: 0.8 %
BILIRUB SERPL-MCNC: 0.7 MG/DL (ref 0–1.2)
BILIRUB UR STRIP.AUTO-MCNC: NEGATIVE MG/DL
BUN SERPL-MCNC: 9 MG/DL (ref 6–23)
CALCIUM SERPL-MCNC: 9.8 MG/DL (ref 8.6–10.3)
CHLORIDE SERPL-SCNC: 101 MMOL/L (ref 98–107)
CLUE CELLS SPEC QL WET PREP: PRESENT
CO2 SERPL-SCNC: 29 MMOL/L (ref 21–32)
COLOR UR: ABNORMAL
CREAT SERPL-MCNC: 0.65 MG/DL (ref 0.5–1.05)
EGFRCR SERPLBLD CKD-EPI 2021: >90 ML/MIN/1.73M*2
EOSINOPHIL # BLD AUTO: 0.29 X10*3/UL (ref 0–0.7)
EOSINOPHIL NFR BLD AUTO: 2.8 %
ERYTHROCYTE [DISTWIDTH] IN BLOOD BY AUTOMATED COUNT: 11.9 % (ref 11.5–14.5)
GLUCOSE SERPL-MCNC: 87 MG/DL (ref 74–99)
GLUCOSE UR STRIP.AUTO-MCNC: NEGATIVE MG/DL
HCG UR QL IA.RAPID: NEGATIVE
HCT VFR BLD AUTO: 44.1 % (ref 36–46)
HGB BLD-MCNC: 14.8 G/DL (ref 12–16)
IMM GRANULOCYTES # BLD AUTO: 0.03 X10*3/UL (ref 0–0.7)
IMM GRANULOCYTES NFR BLD AUTO: 0.3 % (ref 0–0.9)
KETONES UR STRIP.AUTO-MCNC: NEGATIVE MG/DL
LACTATE SERPL-SCNC: 1.3 MMOL/L (ref 0.4–2)
LEUKOCYTE ESTERASE UR QL STRIP.AUTO: ABNORMAL
LYMPHOCYTES # BLD AUTO: 1.85 X10*3/UL (ref 1.2–4.8)
LYMPHOCYTES NFR BLD AUTO: 17.9 %
MAGNESIUM SERPL-MCNC: 2.05 MG/DL (ref 1.6–2.4)
MCH RBC QN AUTO: 28.8 PG (ref 26–34)
MCHC RBC AUTO-ENTMCNC: 33.6 G/DL (ref 32–36)
MCV RBC AUTO: 86 FL (ref 80–100)
MONOCYTES # BLD AUTO: 0.64 X10*3/UL (ref 0.1–1)
MONOCYTES NFR BLD AUTO: 6.2 %
NEUTROPHILS # BLD AUTO: 7.47 X10*3/UL (ref 1.2–7.7)
NEUTROPHILS NFR BLD AUTO: 72 %
NITRITE UR QL STRIP.AUTO: NEGATIVE
NRBC BLD-RTO: 0 /100 WBCS (ref 0–0)
PH UR STRIP.AUTO: 8 [PH]
PLATELET # BLD AUTO: 326 X10*3/UL (ref 150–450)
POTASSIUM SERPL-SCNC: 3.8 MMOL/L (ref 3.5–5.3)
PROT SERPL-MCNC: 8 G/DL (ref 6.4–8.2)
PROT UR STRIP.AUTO-MCNC: NEGATIVE MG/DL
RBC # BLD AUTO: 5.14 X10*6/UL (ref 4–5.2)
RBC # UR STRIP.AUTO: NEGATIVE /UL
RBC #/AREA URNS AUTO: ABNORMAL /HPF
SODIUM SERPL-SCNC: 142 MMOL/L (ref 136–145)
SP GR UR STRIP.AUTO: 1.01
SQUAMOUS #/AREA URNS AUTO: ABNORMAL /HPF
T VAGINALIS SPEC QL WET PREP: ABNORMAL
TRICHOMONAS REFLEX COMMENT: ABNORMAL
UROBILINOGEN UR STRIP.AUTO-MCNC: <2 MG/DL
WBC # BLD AUTO: 10.4 X10*3/UL (ref 4.4–11.3)
WBC #/AREA URNS AUTO: ABNORMAL /HPF
WBC VAG QL WET PREP: ABNORMAL
YEAST VAG QL WET PREP: ABNORMAL

## 2024-01-15 PROCEDURE — 2500000004 HC RX 250 GENERAL PHARMACY W/ HCPCS (ALT 636 FOR OP/ED): Performed by: PHYSICIAN ASSISTANT

## 2024-01-15 PROCEDURE — 81001 URINALYSIS AUTO W/SCOPE: CPT | Performed by: PHYSICIAN ASSISTANT

## 2024-01-15 PROCEDURE — 76856 US EXAM PELVIC COMPLETE: CPT | Performed by: SURGERY

## 2024-01-15 PROCEDURE — 87210 SMEAR WET MOUNT SALINE/INK: CPT | Performed by: PHYSICIAN ASSISTANT

## 2024-01-15 PROCEDURE — 99284 EMERGENCY DEPT VISIT MOD MDM: CPT | Performed by: STUDENT IN AN ORGANIZED HEALTH CARE EDUCATION/TRAINING PROGRAM

## 2024-01-15 PROCEDURE — 87800 DETECT AGNT MULT DNA DIREC: CPT | Mod: GEALAB | Performed by: PHYSICIAN ASSISTANT

## 2024-01-15 PROCEDURE — 83605 ASSAY OF LACTIC ACID: CPT | Performed by: PHYSICIAN ASSISTANT

## 2024-01-15 PROCEDURE — 80053 COMPREHEN METABOLIC PANEL: CPT | Performed by: PHYSICIAN ASSISTANT

## 2024-01-15 PROCEDURE — 76830 TRANSVAGINAL US NON-OB: CPT | Performed by: SURGERY

## 2024-01-15 PROCEDURE — 87661 TRICHOMONAS VAGINALIS AMPLIF: CPT | Mod: GEALAB | Performed by: PHYSICIAN ASSISTANT

## 2024-01-15 PROCEDURE — 76856 US EXAM PELVIC COMPLETE: CPT

## 2024-01-15 PROCEDURE — 36415 COLL VENOUS BLD VENIPUNCTURE: CPT | Performed by: PHYSICIAN ASSISTANT

## 2024-01-15 PROCEDURE — 81025 URINE PREGNANCY TEST: CPT | Performed by: PHYSICIAN ASSISTANT

## 2024-01-15 PROCEDURE — 96360 HYDRATION IV INFUSION INIT: CPT

## 2024-01-15 PROCEDURE — 83735 ASSAY OF MAGNESIUM: CPT | Performed by: PHYSICIAN ASSISTANT

## 2024-01-15 PROCEDURE — 87205 SMEAR GRAM STAIN: CPT | Mod: GEALAB | Performed by: PHYSICIAN ASSISTANT

## 2024-01-15 PROCEDURE — 87086 URINE CULTURE/COLONY COUNT: CPT | Mod: GEALAB | Performed by: PHYSICIAN ASSISTANT

## 2024-01-15 PROCEDURE — 85025 COMPLETE CBC W/AUTO DIFF WBC: CPT | Performed by: PHYSICIAN ASSISTANT

## 2024-01-15 RX ORDER — ONDANSETRON HYDROCHLORIDE 2 MG/ML
4 INJECTION, SOLUTION INTRAVENOUS ONCE
Status: DISCONTINUED | OUTPATIENT
Start: 2024-01-15 | End: 2024-01-15 | Stop reason: HOSPADM

## 2024-01-15 RX ORDER — METRONIDAZOLE 500 MG/1
500 TABLET ORAL 3 TIMES DAILY
Qty: 30 TABLET | Refills: 0 | Status: SHIPPED | OUTPATIENT
Start: 2024-01-15 | End: 2024-01-25

## 2024-01-15 RX ORDER — KETOROLAC TROMETHAMINE 30 MG/ML
30 INJECTION, SOLUTION INTRAMUSCULAR; INTRAVENOUS ONCE
Status: DISCONTINUED | OUTPATIENT
Start: 2024-01-15 | End: 2024-01-15 | Stop reason: HOSPADM

## 2024-01-15 RX ADMIN — SODIUM CHLORIDE 1000 ML: 9 INJECTION, SOLUTION INTRAVENOUS at 18:37

## 2024-01-15 ASSESSMENT — COLUMBIA-SUICIDE SEVERITY RATING SCALE - C-SSRS
2. HAVE YOU ACTUALLY HAD ANY THOUGHTS OF KILLING YOURSELF?: NO
1. IN THE PAST MONTH, HAVE YOU WISHED YOU WERE DEAD OR WISHED YOU COULD GO TO SLEEP AND NOT WAKE UP?: NO
6. HAVE YOU EVER DONE ANYTHING, STARTED TO DO ANYTHING, OR PREPARED TO DO ANYTHING TO END YOUR LIFE?: NO

## 2024-01-15 ASSESSMENT — PAIN - FUNCTIONAL ASSESSMENT: PAIN_FUNCTIONAL_ASSESSMENT: 0-10

## 2024-01-15 ASSESSMENT — PAIN SCALES - GENERAL: PAINLEVEL_OUTOF10: 5 - MODERATE PAIN

## 2024-01-15 ASSESSMENT — LIFESTYLE VARIABLES
EVER FELT BAD OR GUILTY ABOUT YOUR DRINKING: NO
HAVE PEOPLE ANNOYED YOU BY CRITICIZING YOUR DRINKING: NO
EVER HAD A DRINK FIRST THING IN THE MORNING TO STEADY YOUR NERVES TO GET RID OF A HANGOVER: NO
REASON UNABLE TO ASSESS: NO
HAVE YOU EVER FELT YOU SHOULD CUT DOWN ON YOUR DRINKING: NO

## 2024-01-15 ASSESSMENT — PAIN DESCRIPTION - LOCATION: LOCATION: ABDOMEN

## 2024-01-15 NOTE — ED TRIAGE NOTES
Patient here for pelvic pain X 2 days. Extreme bloating and cramping in uterus and back pain. Endorses watery discharge. Hx of PID, herpes, yeast infection, BV.

## 2024-01-16 LAB
C TRACH RRNA SPEC QL NAA+PROBE: NEGATIVE
CLUE CELLS VAG LPF-#/AREA: NORMAL /[LPF]
HOLD SPECIMEN: NORMAL
N GONORRHOEA DNA SPEC QL PROBE+SIG AMP: NEGATIVE
NUGENT SCORE: 1
T VAGINALIS RRNA SPEC QL NAA+PROBE: NEGATIVE
YEAST VAG WET PREP-#/AREA: NORMAL

## 2024-01-16 NOTE — ED PROVIDER NOTES
"HPI   Chief Complaint   Patient presents with    Groin Pain     X 2 days. Extreme bloating and cramping in uterus and back pain. Endorses watery discharge. Hx of PID, herpes, yeast infection, BV.        23-year-old female with a history of PID, herpes, frequent yeast infections, gastritis and UTIs presenting with chief complaint of lower abdominal pain.  Patient states she just had her period that ended last week and over the past couple of days she has noticed cramping just above her pubic area.  She states she did have some watery discharge a couple of days ago however it has lightened up at this point.  She denies any fevers or chills at home.  No hematuria.  She presents because she is concerned that she may \"have something else going on down there\".                          Grace Coma Scale Score: 15                  Patient History   History reviewed. No pertinent past medical history.  History reviewed. No pertinent surgical history.  No family history on file.  Social History     Tobacco Use    Smoking status: Never    Smokeless tobacco: Never   Substance Use Topics    Alcohol use: Not Currently     Alcohol/week: 2.0 standard drinks of alcohol     Types: 2 Cans of beer per week    Drug use: Never       Physical Exam   ED Triage Vitals [01/15/24 1715]   Temp Heart Rate Resp BP   36.8 °C (98.2 °F) 99 18 129/85      SpO2 Temp Source Heart Rate Source Patient Position   99 % Skin Monitor Lying      BP Location FiO2 (%)     Right arm --       Physical Exam  Constitutional:       General: She is not in acute distress.     Appearance: She is not ill-appearing or toxic-appearing.   HENT:      Head: Normocephalic and atraumatic.      Right Ear: Tympanic membrane normal.      Left Ear: Tympanic membrane normal.      Nose: Nose normal.      Mouth/Throat:      Mouth: Mucous membranes are moist.   Eyes:      Extraocular Movements: Extraocular movements intact.      Pupils: Pupils are equal, round, and reactive to " light.   Cardiovascular:      Rate and Rhythm: Normal rate and regular rhythm.      Pulses: Normal pulses.      Heart sounds: Normal heart sounds. No murmur heard.     No friction rub. No gallop.   Pulmonary:      Effort: Pulmonary effort is normal. No respiratory distress.      Breath sounds: Normal breath sounds. No wheezing, rhonchi or rales.   Chest:      Chest wall: No tenderness.   Abdominal:      General: There is no distension.      Palpations: Abdomen is soft.      Tenderness: There is no abdominal tenderness. There is no guarding.   Genitourinary:     General: Normal vulva.      Vagina: Vaginal discharge present.      Comments: Patient with thin white watery discharge that is malodorous.  There is no cervical motion tenderness on bimanual exam.  The vaginal vault otherwise looks normal.  There is no erythema or swelling of the vulva or fixed vaginal vault.  There is no adnexal tenderness on exam.  Musculoskeletal:         General: No swelling, tenderness, deformity or signs of injury.      Cervical back: Normal range of motion. No tenderness.   Skin:     General: Skin is warm and dry.      Capillary Refill: Capillary refill takes less than 2 seconds.   Neurological:      General: No focal deficit present.      Mental Status: She is alert and oriented to person, place, and time.      Motor: No weakness.   Psychiatric:         Mood and Affect: Mood normal.         Behavior: Behavior normal.         ED Course & MDM   ED Course as of 01/15/24 2032   Mon Ang 15, 2024   1942 Lactate: 1.3 [MW]   1943 Microscopic Only, Urine(!) [MW]   1943 CBC and Auto Differential [MW]   1944 Comprehensive metabolic panel [MW]   1944 Urinalysis with Reflex Culture and Microscopic(!) [MW]   1944 hCG, Urine, Qualitative [MW]   1944 Urine hCG is negative lowering suspicion for ectopic pregnancy.  Urinalysis is grossly unremarkable as well.  The patient does not have an elevated white blood cell count or electrolyte derangements.   Her lactate is normal. [MW]      ED Course User Index  [MW] Caesar Staley PA-C         Diagnoses as of 01/15/24 2032   Vaginal discharge       Medical Decision Making  23-year-old female with an extensive gynecologic history presenting with suprapubic pain.  STI panel ordered.  UA and lab ordered.  Ultrasound ordered to rule out torsion or tubo-ovarian abscess.    Labs are largely unremarkable.  She does not have a leukocytosis or elevated lactate.  Her exam is also reassuring.  She has no cervical motion tenderness.  She has no adnexal tenderness.  She has been hemodynamically normal since arrival.  Given the malodorous thin discharge, patient would like to start antibiotics before her cultures come back.  She would like them sent to the [facility].  The patient was given strict return precautions should she develop a fever, nausea and vomiting, discharge that changes in nature or worsening abdominal pain.        Procedure  Procedures     Caesar Staley PA-C  01/15/24 1945       Caesar Staley PA-C  01/15/24 2028       Caesar Staley PA-C  01/15/24 2032

## 2024-01-17 LAB — BACTERIA UR CULT: NORMAL

## 2024-01-23 ENCOUNTER — APPOINTMENT (OUTPATIENT)
Dept: PRIMARY CARE | Facility: CLINIC | Age: 24
End: 2024-01-23
Payer: COMMERCIAL

## 2024-01-29 ENCOUNTER — APPOINTMENT (OUTPATIENT)
Dept: HEMATOLOGY/ONCOLOGY | Facility: HOSPITAL | Age: 24
End: 2024-01-29
Payer: COMMERCIAL

## 2024-02-02 ENCOUNTER — TELEMEDICINE (OUTPATIENT)
Dept: HEMATOLOGY/ONCOLOGY | Facility: HOSPITAL | Age: 24
End: 2024-02-02
Payer: COMMERCIAL

## 2024-02-02 VITALS
TEMPERATURE: 98.5 F | RESPIRATION RATE: 18 BRPM | HEART RATE: 86 BPM | HEIGHT: 66 IN | WEIGHT: 170.1 LBS | BODY MASS INDEX: 27.34 KG/M2 | SYSTOLIC BLOOD PRESSURE: 110 MMHG | DIASTOLIC BLOOD PRESSURE: 73 MMHG | OXYGEN SATURATION: 96 %

## 2024-02-02 DIAGNOSIS — K90.49 MALABSORPTION DUE TO INTOLERANCE, NOT ELSEWHERE CLASSIFIED: ICD-10-CM

## 2024-02-02 DIAGNOSIS — D50.8 IRON DEFICIENCY ANEMIA SECONDARY TO INADEQUATE DIETARY IRON INTAKE: ICD-10-CM

## 2024-02-02 PROCEDURE — 99214 OFFICE O/P EST MOD 30 MIN: CPT

## 2024-02-02 RX ORDER — SERTRALINE HYDROCHLORIDE 25 MG/1
25 TABLET, FILM COATED ORAL DAILY
COMMUNITY

## 2024-02-02 RX ORDER — PANTOPRAZOLE SODIUM 20 MG/1
20 TABLET, DELAYED RELEASE ORAL
COMMUNITY

## 2024-02-02 RX ORDER — TRAZODONE HYDROCHLORIDE 50 MG/1
50 TABLET ORAL NIGHTLY
COMMUNITY

## 2024-02-02 ASSESSMENT — PAIN SCALES - GENERAL: PAINLEVEL: 0-NO PAIN

## 2024-02-02 ASSESSMENT — PATIENT HEALTH QUESTIONNAIRE - PHQ9
1. LITTLE INTEREST OR PLEASURE IN DOING THINGS: NOT AT ALL
SUM OF ALL RESPONSES TO PHQ9 QUESTIONS 1 & 2: 0
2. FEELING DOWN, DEPRESSED OR HOPELESS: NOT AT ALL

## 2024-02-02 NOTE — PROGRESS NOTES
Patient Visit Information:   Visit Type: Follow up Visit       PCP: Dr. Sid Staley, at [facility]    History of Present Illness:     Hematology History:  Patient has had a low ferritin since 2018, tried oral iron but had GI intolerance, 2019 EGD and colonoscopy with a polyp removed otherwise no findings and had a second colonoscopy shortly after that because of bleeding status post staples, no red meat since 2020 d/t intolerance, no pica, no diagnosis of celiac disease, no use of a PPI.  She has had in the toilet after bowel movements for the past several weeks, she relates to her hemorrhoids, new occurrence following gastroenterologist December 2023.  GI has treated her previously for this issue. Treatment for H. pylori March 2023 by nurse practitioner Bubba Contreras but had intolerance to the medication, also has strep throat treated March 2023.  She has finished her H. pylori treatments. Consult new GI and she was advised to discontinue PPI per her report.  ER visit in April 2023 regarding anxiety, CT head no acute abnormality.   Patient has anxiety and depression, followed by psychiatry, mood doing okay right now although her medications may be adjusted due to dizziness.  She is on Zoloft and follows psychiatry.     She has heavy menses changing her feminine product every 2-3 hours, follows with gynecology who is considering endometriosis with further evaluation pending, took OCPs in the past without benefit. Her last menstrual started 8 days ago and lasted 5 days. Chronic fluctuating diarrhea and constipation diagnosed with irritable bowel syndrome/disease, intermittent abdominal pain.  She has chronic sweats for years.  She has chronic headaches which are ongoing and being evaluated  by neurology with CT brain and neck 04/2023 normal. Saw a private neurologist did MRI was normal per her report.  She has seen a cardiologist because of some intermittent chest pain and palpitations. She wore a heart monitor and had a  "echo and stress test all negative. Reports \"skipping beat still.\" Follow PRN. She follows with an allergist/immunologist for history of multiple allergies. She saw vascular surgery for her Raynaud's ml, post covid, vascular response. Follows Hematology at [facility], labs in July 2023. Receives B12 injections weekly through them.     ID Statement:    RUFINA EDWARDS is a 23 year old Female     Chief Complaint: Follow-up regarding ANTOINE and several vitamin Deficiencies    Interval History:    Patient is a 23-year-old  female who presents for follow-up visit.  Family history of thalassemia and personal history of low ferritin, copper, vitamin b12 and folate. She reports a cold today and feels under the weather.   She has chronic intermittent shortness of breath with activity.  Patient denies any fevers, lymphadenopathy, recent or recurrent infections, edema, cough, dysuria or hematuria, pain or neurologic symptoms except as above, rashes or lumps, abnormal bleeding except as above, diabetes or thyroid disorder. She reports \"okay\" appetite, increased weight, near;y 30 pounds.  Drinks plenty of fluid.  Her main complaint is fatigue and loss of energy.      Past medical history  -Anemia   -April 2023 hematuria-Per patient no menses at the time  -Depression and anxiety  -H. pylori in March 2023   -Ruptured ovarian cyst seen on CT 3/2023, also hospitalized for this 11/2015. Gynecology discussed laparoscopy 2/2020 to evaluate for endometriosis.  -Asthma  as a child  -Seasonal allergies  -Hypokalemia March 2023, 2019, 2015  -March/April 2023 strep throat   -Headaches   -Genital HSV  -Palpitations   -COVID 10/2021, 12/2021, 8/2022    Past surgical history  [place] teeth removal,  EGD, colonoscopy     Social history:  Patient lives with her mother, stepfather, sister, and boyfriend.   She denies ever smoking, no alcohol or drug abuse.     Family history:  Mom, living thalassemia -chronic anemia, hysterectomy due to heavy bleeding  No " other known hematology or cancer diagnosis is in her family.       Review of Systems:   System Review-All other systems including Neurologic, Cardiac, Gastrointestinal, Endocrine, Dermatologic, ENT, Respiratory, Infectious, Urologic, Musculoskeletal  have been reviewed and are negative for complaint outside of events noted below and within the assessment.      Allergies and Intolerances:       Allergies:               Allergies   Allergen Reactions    Lorazepam Unknown    Milk Unknown    Morphine Unknown         Outpatient Medication Profile:       Current Outpatient Medications   Medication Instructions    famotidine (Pepcid) 20 mg tablet oral    norethindrone (Aygestin) 5 mg tablet 1 tablet, oral, Daily    ondansetron (Zofran) 4 mg tablet 1 tablet, oral, Every 6 hours    triamcinolone (Kenalog) 0.5 % ointment 2 times daily,     APPLY SPARINGLY TO AFFECTED AREA(S)                Medical History:         Intestinal malabsorption: ICD-10: K90.9, Status: Active         Iron deficiency: ICD-10: E61.1, Status: Active         Colon polyp: ICD-10: K63.5, Status: Active         Hematuria: ICD-10: R31.9, Status: Active         Weight loss: ICD-10: R63.4, Status: Active         Fatigue: ICD-10: R53.83, Status: Active         Elevated blood protein: ICD-10: R77.9, Status: Active         Leukocytosis: ICD-10: D72.829, Status: Active         Family history of thalassemia: ICD-10: Z83.2, Status: Active         Shortness of breath at rest: ICD-10: R06.02, Status: Active         Lactose intolerance: ICD-10: E73.9, Status: Active         Major depressive disorder: ICD-10: F32.9, Status: Active        Performance:   ECOG Performance Status: 0 Fully Active     Physical Exam:  General: Patient is awake/alert/oriented x3, no distress, Nourished, hydrated, alert and cooperative, ambulating without difficulty  Skin: Expected color for ethnicity, good turgor, dry, no prominent lesions, rashes, unusual bruising, or bleeding   Hair: Normal  texture and distribution   Nails: Normal color, no deformities    HEENT:   Head: Normocephalic, atraumatic, no visible or palpable masses, depressions, or scarring   Eyes: Visual acuity intact, conjunctiva clear, sclera non-icteric, PERRL, EOMI, no exudates or hemorrhages   Ears: nl appearance, hearing intact    Nose: no external lesions, mucosa non-inflamed, no rhinorrhea   Mouth: Mucous membranes moist, no lesions, sores, bleeding, or erythema     Head/Neck: Neck supple, no apparent injury, thyroid without mass or tenderness, No JVD, trachea midline, no bruits appreciated    Respiratory/Thorax: Patent airways, CTAB, chest symmetry, normal inspiratory and expiratory effort    Cardiovascular: Regular rate and rhythm, no murmur or gallop, no carotid bruit or thrills    Gastrointestinal: Bowel sounds normal, non-distended, soft, no tenderness, no masses or hernia, or organomegaly appreciated    Genitourinary: deferred   Musculoskeletal: Normal gait, normal range of motion, no pain on palpation of spine, no deformity, normal strength for baseline, no atrophy, and no CVA tenderness appreciated   Extremities: No amputations or deformities, cyanosis, edema or viscosities, peripheral pulses intact   Neurological: CN 2-12 normal. Sensation present to touch, intact senses, motor response and reflexes normal, normal strength   Breast: deferred    Lymphatic: No significant lymphadenopathy   Psychological: A/O X3, intact recent and remote memory, judgement, and insight. Appropriate mood, affect, and behavior        Visit Vitals  /73 (BP Location: Left arm, Patient Position: Sitting)   Pulse 86   Temp 36.9 °C (98.5 °F) (Temporal)   Resp 18      Vitals:    02/02/24 1347   Weight: 77.2 kg (170 lb 1.6 oz)       Last 3 Weights & Heights:   16-Aug-2023 10:34                64.4  kg                     165.5  cm  11-Aug-2023 12:47                63.4  kg                     165.5  cm  04-Aug-2023 12:33                63.7  kg                      165.5  cm         Lab Results:    Lab Results   Component Value Date    WBC 10.4 01/15/2024    NEUTROABS 7.47 01/15/2024    IGABSOL 0.03 01/15/2024    LYMPHSABS 1.85 01/15/2024    MONOSABS 0.64 01/15/2024    EOSABS 0.29 01/15/2024    BASOSABS 0.08 01/15/2024    RBC 5.14 01/15/2024    MCV 86 01/15/2024    MCHC 33.6 01/15/2024    HGB 14.8 01/15/2024    HCT 44.1 01/15/2024     01/15/2024     Lab Results   Component Value Date    RETICCTPCT 2.0 09/25/2023      Lab Results   Component Value Date    CREATININE 0.65 01/15/2024    BUN 9 01/15/2024    EGFR >90 01/15/2024     01/15/2024    K 3.8 01/15/2024     01/15/2024    CO2 29 01/15/2024      Lab Results   Component Value Date    ALT 12 01/15/2024    AST 14 01/15/2024    ALKPHOS 66 01/15/2024    BILITOT 0.7 01/15/2024      Lab Results   Component Value Date    TSH 1.59 08/16/2023     Lab Results   Component Value Date    TSH 1.59 08/16/2023     Lab Results   Component Value Date    IRON 125 01/12/2024    TIBC 310 01/12/2024    FERRITIN 87 01/12/2024      Lab Results   Component Value Date    WEYJBGPK62 >2,000 (H) 10/20/2023      Lab Results   Component Value Date    FOLATE 16.6 09/25/2023     Lab Results   Component Value Date    SPEP NORMAL 06/15/2023     Radiology Result:  Impression:  No findings of an acute cardiopulmonary process.   Xray Chest 1 View [Jun 11 2023  9:54PM]  Impression:  No CT evidence of acute intracranialpathology.   CT Head without Contrast [Apr 2 2023  5:56PM]  Impression:  1. Ruptured right ovarian follicle.  2. Small to moderate amount of free fluid in the cul-de-sac.  3. Normal appendix.  4. No CT evidence of acute pancreatitis.   CT Abdomen and Pelvis with IV Contrast [Mar 24 2023  4:49PM]     Assessment and Plan:   Patient presents today for follow up. Iron TSAT 22 today. Reports heavy mensis the chronic fatigue.  Additional new diagnosis of autoimmune gastritis. Following GI. She is receiving B12  injections through [facility] hematology group.  B12 level is over 2000, advised to discuss reducing weekly injections.   Encouraged her to continue healthy diet and hydration.    Hx iron infusions intermittently. No iron deficiency today. She requested other vitamin levels assessed due to chronic fatigue and hx.   She completes all her screenings.      Follow-up:     RTC:  --6 months with labs    Medication:  --no medication prescribed today         Referral:  -- GI for bleeding hemorrhoids       Discussed labs in detail, answered questions addressed concerns, she agrees with plan of care.  We will discuss further plan of care at following appointment.  She will call with any symptoms.       Thank you for allowing me to care for you today.     Sincerely,  Acacia Hutton, APRN-CNP

## 2024-02-09 ENCOUNTER — LAB (OUTPATIENT)
Dept: LAB | Facility: LAB | Age: 24
End: 2024-02-09
Payer: COMMERCIAL

## 2024-02-09 DIAGNOSIS — D50.8 IRON DEFICIENCY ANEMIA SECONDARY TO INADEQUATE DIETARY IRON INTAKE: ICD-10-CM

## 2024-02-09 DIAGNOSIS — K90.49 MALABSORPTION DUE TO INTOLERANCE, NOT ELSEWHERE CLASSIFIED: ICD-10-CM

## 2024-02-09 LAB
MAGNESIUM SERPL-MCNC: 2.07 MG/DL (ref 1.6–2.4)
PHOSPHATE SERPL-MCNC: 4.2 MG/DL (ref 2.5–4.9)
TSH SERPL-ACNC: 2.63 MIU/L (ref 0.44–3.98)

## 2024-02-09 PROCEDURE — 83921 ORGANIC ACID SINGLE QUANT: CPT

## 2024-02-09 PROCEDURE — 82180 ASSAY OF ASCORBIC ACID: CPT

## 2024-02-09 PROCEDURE — 84443 ASSAY THYROID STIM HORMONE: CPT

## 2024-02-09 PROCEDURE — 84425 ASSAY OF VITAMIN B-1: CPT

## 2024-02-09 PROCEDURE — 82306 VITAMIN D 25 HYDROXY: CPT

## 2024-02-09 PROCEDURE — 86376 MICROSOMAL ANTIBODY EACH: CPT

## 2024-02-09 PROCEDURE — 82525 ASSAY OF COPPER: CPT

## 2024-02-09 PROCEDURE — 84207 ASSAY OF VITAMIN B-6: CPT

## 2024-02-09 PROCEDURE — 84591 ASSAY OF NOS VITAMIN: CPT

## 2024-02-09 PROCEDURE — 82746 ASSAY OF FOLIC ACID SERUM: CPT

## 2024-02-09 PROCEDURE — 84100 ASSAY OF PHOSPHORUS: CPT

## 2024-02-09 PROCEDURE — 86038 ANTINUCLEAR ANTIBODIES: CPT

## 2024-02-09 PROCEDURE — 83735 ASSAY OF MAGNESIUM: CPT

## 2024-02-09 PROCEDURE — 36415 COLL VENOUS BLD VENIPUNCTURE: CPT

## 2024-02-09 PROCEDURE — 82607 VITAMIN B-12: CPT

## 2024-02-10 LAB
25(OH)D3 SERPL-MCNC: 28 NG/ML (ref 30–100)
FOLATE SERPL-MCNC: 15.4 NG/ML
THYROPEROXIDASE AB SERPL-ACNC: 657 IU/ML
VIT B12 SERPL-MCNC: >2000 PG/ML (ref 211–911)

## 2024-02-12 LAB
ANA SER QL HEP2 SUBST: NEGATIVE
COPPER SERPL-MCNC: 113 UG/DL (ref 80–155)

## 2024-02-13 LAB — PYRIDOXAL PHOS SERPL-SCNC: 44.2 NMOL/L (ref 20–125)

## 2024-02-14 LAB — VIT B1 PYROPHOSHATE BLD-SCNC: 137 NMOL/L (ref 70–180)

## 2024-02-15 LAB
METHYLMALONATE SERPL-SCNC: <0.1 UMOL/L (ref 0–0.4)
NIACIN SERPL-MCNC: NORMAL NG/ML
NICOTINAMIDE SERPL-MCNC: 33 NG/ML
NICOTINURATE SERPL-MCNC: NORMAL NG/ML
VIT C SERPL-MCNC: 15 UMOL/L (ref 23–114)

## 2024-02-16 ENCOUNTER — TELEPHONE (OUTPATIENT)
Dept: HEMATOLOGY/ONCOLOGY | Facility: HOSPITAL | Age: 24
End: 2024-02-16
Payer: COMMERCIAL

## 2024-02-16 DIAGNOSIS — R79.89 ABNORMAL THYROID BLOOD TEST: Primary | ICD-10-CM

## 2024-02-16 NOTE — TELEPHONE ENCOUNTER
Returned patient phone call to discuss results with patient after she had brought up concerns regarding her lab work. Patient was concerned about TPO antibody result = 657. Notifed Jenna Hutton CNP who reviewed lab results and requested I call patient back.     Advised patient that a referral had been placed to Dr. Sexton in Endocrinology and provided patient with office number. Also reiterated to patient to begin/continue taking Vitamin C, a B-complex, and Vitamin D over the counter per Jenna Hutton CNP. Patient verbalized understanding and was agreeable to the plan. Stated she would call Dr. Sexton's office  and begin taking the OTC vitamins/supplements recommended by Brennen HANCOCK. Denied any questions.

## 2024-02-16 NOTE — TELEPHONE ENCOUNTER
Patient called and was wondering if GUADALUPE Rooney had time today to call her and discuss results. Let patient know I would pass this along to the provider. Patient verbalized and agreed.

## 2024-04-25 ENCOUNTER — OFFICE VISIT (OUTPATIENT)
Dept: ENDOCRINOLOGY | Facility: CLINIC | Age: 24
End: 2024-04-25
Payer: COMMERCIAL

## 2024-04-25 VITALS
SYSTOLIC BLOOD PRESSURE: 115 MMHG | WEIGHT: 178 LBS | DIASTOLIC BLOOD PRESSURE: 81 MMHG | BODY MASS INDEX: 28.73 KG/M2 | HEART RATE: 85 BPM

## 2024-04-25 DIAGNOSIS — E06.3 HASHIMOTO'S THYROIDITIS: Primary | ICD-10-CM

## 2024-04-25 DIAGNOSIS — E04.9 GOITER: ICD-10-CM

## 2024-04-25 DIAGNOSIS — R79.89 ABNORMAL THYROID BLOOD TEST: ICD-10-CM

## 2024-04-25 PROCEDURE — 99204 OFFICE O/P NEW MOD 45 MIN: CPT | Performed by: INTERNAL MEDICINE

## 2024-04-25 RX ORDER — IBUPROFEN 200 MG
600 TABLET ORAL EVERY 6 HOURS PRN
COMMUNITY

## 2024-04-25 RX ORDER — VALACYCLOVIR HYDROCHLORIDE 500 MG/1
500 TABLET, FILM COATED ORAL AS NEEDED
COMMUNITY

## 2024-04-25 RX ORDER — COPPER GLUCONATE 2 MG
2 TABLET ORAL
COMMUNITY
Start: 2023-08-09

## 2024-04-25 RX ORDER — HYDROXYZINE HYDROCHLORIDE 50 MG/1
50 TABLET, FILM COATED ORAL AS NEEDED
COMMUNITY
Start: 2023-04-04

## 2024-04-25 RX ORDER — CETIRIZINE HYDROCHLORIDE 10 MG/1
10 TABLET ORAL DAILY
COMMUNITY
Start: 2023-10-02

## 2024-04-25 RX ORDER — FLUTICASONE PROPIONATE 50 MCG
2 SPRAY, SUSPENSION (ML) NASAL EVERY 24 HOURS
COMMUNITY
Start: 2024-04-10

## 2024-04-25 RX ORDER — ERGOCALCIFEROL 1.25 MG/1
50000 CAPSULE ORAL WEEKLY
COMMUNITY
Start: 2023-08-09 | End: 2024-04-25 | Stop reason: WASHOUT

## 2024-04-25 RX ORDER — SACROSIDASE 8500 [IU]/ML
SOLUTION ORAL
COMMUNITY
Start: 2024-01-17

## 2024-04-25 ASSESSMENT — PAIN SCALES - GENERAL: PAINLEVEL: 0-NO PAIN

## 2024-04-25 NOTE — PROGRESS NOTES
History Of Present Illness  Elizabeth Toro is a 24 y.o. female with Hashimoto's thyroiditis    Multiple autoimmune disorders discovered 10 months ago    No known goiter  Some dysphagia  Sore throat resolved.    Weight loss last year before diagnosis of autoimmune gastritis  Regained in excess, net 50 lbs from before illness.    Family history of Hashmoto's thyroiditis:  paternal Uncle and grandfather    Past Medical History  She has a past medical history of Anemia, Autoimmune gastritis, Hashimoto's thyroiditis, and Vitamin B 12 deficiency.    Surgical History  She has no past surgical history on file.     Social History  She reports that she has never smoked. She has never used smokeless tobacco. She reports that she does not currently use alcohol after a past usage of about 2.0 standard drinks of alcohol per week. She reports that she does not use drugs.    Family History  No family history on file.    Medications  Current Outpatient Medications   Medication Instructions    cetirizine (ZYRTEC) 10 mg, oral, Daily    copper gluconate 2 mg, oral, Daily RT    famotidine (Pepcid) 20 mg tablet oral    fluticasone (Flonase) 50 mcg/actuation nasal spray 2 sprays, Each Nostril, Every 24 hours    hydrOXYzine HCL (ATARAX) 50 mg, oral, As needed    ibuprofen 600 mg, oral, Every 6 hours PRN    ondansetron (Zofran) 4 mg tablet 1 tablet, oral, Every 6 hours    pantoprazole (PROTONIX) 20 mg, oral, Daily before breakfast, Do not crush, chew, or split.    sertraline (ZOLOFT) 25 mg, oral, Daily    Sucraid 8,500 unit/mL solution Take 2mL by mouth with every meal or snack up to 6 times per day    traZODone (DESYREL) 50 mg, oral, Nightly    triamcinolone (Kenalog) 0.5 % ointment 2 times daily,  APPLY SPARINGLY TO AFFECTED AREA(S)     valACYclovir (VALTREX) 500 mg, oral, As needed       Allergies  House dust mite, Lorazepam, Milk, and Morphine    Review of Systems   Constitutional:  Positive for unexpected weight change (gain).  Negative for fatigue and fever.   HENT:  Positive for tinnitus and trouble swallowing.    Eyes:  Positive for visual disturbance.   Respiratory:  Positive for cough and wheezing. Negative for shortness of breath.    Cardiovascular:  Positive for palpitations. Negative for chest pain.   Gastrointestinal:  Positive for constipation and diarrhea. Negative for abdominal pain, nausea and vomiting.   Endocrine: Negative for cold intolerance and heat intolerance.   Musculoskeletal:  Positive for back pain and neck pain.   Skin:  Negative for rash.   Neurological:  Positive for dizziness and numbness. Negative for tremors, weakness and headaches.   Psychiatric/Behavioral:  Positive for dysphoric mood. The patient is nervous/anxious.          Last Recorded Vitals  Blood pressure 115/81, pulse 85, weight 80.7 kg (178 lb).    Physical Exam  Constitutional:       General: She is not in acute distress.  HENT:      Head: Normocephalic.   Eyes:      Extraocular Movements: Extraocular movements intact.   Neck:      Thyroid: No thyroid mass.      Comments: Thyroid mildly enlarged, asymmetric  Cardiovascular:      Pulses:           Radial pulses are 2+ on the right side and 2+ on the left side.   Musculoskeletal:      Right lower leg: No edema.      Left lower leg: No edema.   Lymphadenopathy:      Cervical: No cervical adenopathy.   Neurological:      Mental Status: She is alert.      Motor: No tremor.   Psychiatric:         Mood and Affect: Affect normal.          Relevant Results  Lab Results   Component Value Date    TSH 2.63 02/09/2024    THYROIDPAB 657 (H) 02/09/2024         IMPRESSION  HASHIMOTO'S THYROIDITIS  GOITER  Evidence of thyroid autoimmunity  No history of thyroid dysfunction  Asymmetric thyroid enlargement on exam, also consistent with Hashimoto's  Hashimoto's thyroiditis is an autoimmune destruction of the thyroid, and the most common cause of hypothyroidism in the [place].      RECOMMENDATIONS  Ultrasound  thyroid  Repeat thyroid labs  Results available on [employer]  Call/message if you have not received results in 3 days.     HOLD Biotin for 2 days before any blood draw    If normal, repeat TSH in 6 months  Follow up 6 months

## 2024-04-25 NOTE — PATIENT INSTRUCTIONS
RECOMMENDATIONS  Ultrasound thyroid  Repeat thyroid labs  Results available on [employer]  Call/message if you have not received results in 3 days.     HOLD Biotin for 2 days before any blood draw    If normal, repeat TSH in 6 months  Follow up 6 months

## 2024-04-29 ENCOUNTER — HOSPITAL ENCOUNTER (OUTPATIENT)
Dept: RADIOLOGY | Facility: HOSPITAL | Age: 24
Discharge: HOME | End: 2024-04-29
Payer: COMMERCIAL

## 2024-04-29 ENCOUNTER — LAB (OUTPATIENT)
Dept: LAB | Facility: LAB | Age: 24
End: 2024-04-29
Payer: COMMERCIAL

## 2024-04-29 DIAGNOSIS — E06.3 HASHIMOTO'S THYROIDITIS: ICD-10-CM

## 2024-04-29 DIAGNOSIS — D50.8 IRON DEFICIENCY ANEMIA SECONDARY TO INADEQUATE DIETARY IRON INTAKE: ICD-10-CM

## 2024-04-29 DIAGNOSIS — E04.9 GOITER: ICD-10-CM

## 2024-04-29 DIAGNOSIS — K90.49 MALABSORPTION DUE TO INTOLERANCE, NOT ELSEWHERE CLASSIFIED: ICD-10-CM

## 2024-04-29 LAB
ALBUMIN SERPL BCP-MCNC: 4.7 G/DL (ref 3.4–5)
ALP SERPL-CCNC: 66 U/L (ref 33–110)
ALT SERPL W P-5'-P-CCNC: 11 U/L (ref 7–45)
ANION GAP SERPL CALC-SCNC: 11 MMOL/L (ref 10–20)
AST SERPL W P-5'-P-CCNC: 13 U/L (ref 9–39)
BASOPHILS # BLD AUTO: 0.06 X10*3/UL (ref 0–0.1)
BASOPHILS NFR BLD AUTO: 0.6 %
BILIRUB SERPL-MCNC: 0.3 MG/DL (ref 0–1.2)
BUN SERPL-MCNC: 12 MG/DL (ref 6–23)
CALCIUM SERPL-MCNC: 9 MG/DL (ref 8.6–10.3)
CHLORIDE SERPL-SCNC: 104 MMOL/L (ref 98–107)
CO2 SERPL-SCNC: 29 MMOL/L (ref 21–32)
CREAT SERPL-MCNC: 0.66 MG/DL (ref 0.5–1.05)
EGFRCR SERPLBLD CKD-EPI 2021: >90 ML/MIN/1.73M*2
EOSINOPHIL # BLD AUTO: 0.29 X10*3/UL (ref 0–0.7)
EOSINOPHIL NFR BLD AUTO: 2.8 %
ERYTHROCYTE [DISTWIDTH] IN BLOOD BY AUTOMATED COUNT: 11.9 % (ref 11.5–14.5)
FERRITIN SERPL-MCNC: 50 NG/ML (ref 8–150)
GLUCOSE SERPL-MCNC: 92 MG/DL (ref 74–99)
HCT VFR BLD AUTO: 40.6 % (ref 36–46)
HGB BLD-MCNC: 13 G/DL (ref 12–16)
IMM GRANULOCYTES # BLD AUTO: 0.03 X10*3/UL (ref 0–0.7)
IMM GRANULOCYTES NFR BLD AUTO: 0.3 % (ref 0–0.9)
IRON SATN MFR SERPL: 12 % (ref 25–45)
IRON SERPL-MCNC: 38 UG/DL (ref 35–150)
LYMPHOCYTES # BLD AUTO: 3.38 X10*3/UL (ref 1.2–4.8)
LYMPHOCYTES NFR BLD AUTO: 33.1 %
MCH RBC QN AUTO: 27.8 PG (ref 26–34)
MCHC RBC AUTO-ENTMCNC: 32 G/DL (ref 32–36)
MCV RBC AUTO: 87 FL (ref 80–100)
MONOCYTES # BLD AUTO: 0.67 X10*3/UL (ref 0.1–1)
MONOCYTES NFR BLD AUTO: 6.6 %
NEUTROPHILS # BLD AUTO: 5.77 X10*3/UL (ref 1.2–7.7)
NEUTROPHILS NFR BLD AUTO: 56.6 %
NRBC BLD-RTO: 0 /100 WBCS (ref 0–0)
PLATELET # BLD AUTO: 317 X10*3/UL (ref 150–450)
POTASSIUM SERPL-SCNC: 3.9 MMOL/L (ref 3.5–5.3)
PROT SERPL-MCNC: 7.1 G/DL (ref 6.4–8.2)
RBC # BLD AUTO: 4.67 X10*6/UL (ref 4–5.2)
SODIUM SERPL-SCNC: 140 MMOL/L (ref 136–145)
T3FREE SERPL-MCNC: 3.1 PG/ML (ref 2.3–4.2)
T4 FREE SERPL-MCNC: 0.64 NG/DL (ref 0.61–1.12)
TIBC SERPL-MCNC: 313 UG/DL (ref 240–445)
TSH SERPL-ACNC: 3.89 MIU/L (ref 0.44–3.98)
UIBC SERPL-MCNC: 275 UG/DL (ref 110–370)
VIT B12 SERPL-MCNC: >2000 PG/ML (ref 211–911)
WBC # BLD AUTO: 10.2 X10*3/UL (ref 4.4–11.3)

## 2024-04-29 PROCEDURE — 83550 IRON BINDING TEST: CPT

## 2024-04-29 PROCEDURE — 83540 ASSAY OF IRON: CPT

## 2024-04-29 PROCEDURE — 36415 COLL VENOUS BLD VENIPUNCTURE: CPT

## 2024-04-29 PROCEDURE — 84439 ASSAY OF FREE THYROXINE: CPT

## 2024-04-29 PROCEDURE — 82607 VITAMIN B-12: CPT

## 2024-04-29 PROCEDURE — 76536 US EXAM OF HEAD AND NECK: CPT | Performed by: RADIOLOGY

## 2024-04-29 PROCEDURE — 80053 COMPREHEN METABOLIC PANEL: CPT

## 2024-04-29 PROCEDURE — 84481 FREE ASSAY (FT-3): CPT

## 2024-04-29 PROCEDURE — 84443 ASSAY THYROID STIM HORMONE: CPT

## 2024-04-29 PROCEDURE — 76536 US EXAM OF HEAD AND NECK: CPT

## 2024-04-29 PROCEDURE — 82728 ASSAY OF FERRITIN: CPT

## 2024-04-29 PROCEDURE — 85025 COMPLETE CBC W/AUTO DIFF WBC: CPT

## 2024-05-03 ENCOUNTER — TELEPHONE (OUTPATIENT)
Dept: HEMATOLOGY/ONCOLOGY | Facility: HOSPITAL | Age: 24
End: 2024-05-03

## 2024-05-03 ENCOUNTER — TELEMEDICINE (OUTPATIENT)
Dept: HEMATOLOGY/ONCOLOGY | Facility: HOSPITAL | Age: 24
End: 2024-05-03
Payer: COMMERCIAL

## 2024-05-03 DIAGNOSIS — D50.8 IRON DEFICIENCY ANEMIA SECONDARY TO INADEQUATE DIETARY IRON INTAKE: ICD-10-CM

## 2024-05-03 DIAGNOSIS — K29.40 AUTOIMMUNE GASTRITIS: Primary | ICD-10-CM

## 2024-05-03 DIAGNOSIS — K90.49 MALABSORPTION DUE TO INTOLERANCE, NOT ELSEWHERE CLASSIFIED: ICD-10-CM

## 2024-05-03 PROCEDURE — 99214 OFFICE O/P EST MOD 30 MIN: CPT

## 2024-05-03 RX ORDER — FAMOTIDINE 10 MG/ML
20 INJECTION INTRAVENOUS ONCE AS NEEDED
OUTPATIENT
Start: 2024-05-10

## 2024-05-03 RX ORDER — EPINEPHRINE 0.3 MG/.3ML
0.3 INJECTION SUBCUTANEOUS EVERY 5 MIN PRN
OUTPATIENT
Start: 2024-05-10

## 2024-05-03 RX ORDER — DIPHENHYDRAMINE HYDROCHLORIDE 50 MG/ML
50 INJECTION INTRAMUSCULAR; INTRAVENOUS AS NEEDED
OUTPATIENT
Start: 2024-05-10

## 2024-05-03 RX ORDER — ALBUTEROL SULFATE 0.83 MG/ML
3 SOLUTION RESPIRATORY (INHALATION) AS NEEDED
OUTPATIENT
Start: 2024-05-10

## 2024-05-03 NOTE — TELEPHONE ENCOUNTER
Attempted to reach patient to get her scheduled for her infusions, follow up appt and imaging. Unable to leave message for the patient at this time.

## 2024-05-03 NOTE — PROGRESS NOTES
Patient Visit Information:   Visit Type: Follow up Visit       I performed this visit using real-time telehealth tools, including an audio/video connection between (Elizabeth Toro at her home) and (Acacia Hutton, CNP at [facility])  Patient consents to telemedicine service today and understands the limitations of the visit, no  physical examination and all issues may not be able to be addressed today, other than brief neuro and psych assessment.    PCP: Dr. Sid Staley, at [facility]    History of Present Illness:     Hematology History:  Patient has had a low ferritin since 2018, tried oral iron but had GI intolerance, 2019 EGD and colonoscopy with a polyp removed otherwise no findings and had a second colonoscopy shortly after that because of bleeding status post staples, no red meat since 2020 d/t intolerance, no pica, no diagnosis of celiac disease, no use of a PPI.  She has had in the toilet after bowel movements for the past several weeks, she relates to her hemorrhoids, new occurrence following gastroenterologist December 2023.  GI has treated her previously for this issue. Treatment for H. pylori March 2023 by nurse practitioner Bubba Contreras but had intolerance to the medication, also has strep throat treated March 2023. She has finished her H. pylori treatments. Consult new GI and she was advised to discontinue PPI per her report.  ER visit in April 2023 regarding anxiety, CT head no acute abnormality.   Patient has anxiety and depression, followed by psychiatry, mood doing okay right now although her medications may be adjusted due to dizziness.  She is on Zoloft and follows psychiatry. On trazodone and PPI. Feels multiple factors including.   Hemorrhoids. Sugar issues-hypoglycemia. No bruise or bleeding.   Last period 4/24/2024.   She has heavy menses changing her feminine product every 2-3 hours, follows with gynecology who is considering endometriosis with further evaluation pending, took OCPs in the past  "without benefit. Her last menstrual started 8 days ago and lasted 5 days. Chronic fluctuating diarrhea and constipation diagnosed with irritable bowel syndrome/disease, intermittent abdominal pain.  She has chronic sweats for years.  She has chronic headaches which are ongoing and being evaluated  by neurology with CT brain and neck 04/2023 normal. Saw a private neurologist did MRI was normal per her report.  She has seen a cardiologist because of some intermittent chest pain and palpitations. She wore a heart monitor and had a echo and stress test all negative. Reports \"skipping beat still.\" Follow PRN. She follows with an allergist/immunologist for history of multiple allergies. She saw vascular surgery for her Raynaud's ml, post covid, vascular response. Follow[facility] H[facility]ology at [facility], labs in July 2023. Receives B12 injections weekly through them.     ID Statement:    RUFINA EDWARDS is a 24 year old Female     Chief Complaint: Follow-up regarding ANTOINE and several vitamin Deficiencies    Interval History:    Patient is a 24-year-old  female who presents for follow-up visit.  Family history of thalassemia and personal history of low ferritin, copper, vitamin b12 and folate.   She has chronic intermittent shortness of breath with activity.  More secretion in her lungs and coughing it up a few months. Sinuses allergic. Patient denies any fevers, lymphadenopathy, recent or recurrent infections, edema, cough, dysuria or hematuria, pain or neurologic symptoms except as above, rashes or lumps, abnormal bleeding except as above, diabetes or thyroid disorder. She reports \"okay\" appetite, increased weight, nearly 50 pounds since August 2023.  Drinks plenty of fluid.  Her main complaint is fatigue and loss of energy.      Past medical history  -Anemia  -April 2023 hematuria-Per patient no menses at the time  -Depression and anxiety  -H. pylori in March 2023   -Ruptured ovarian cyst seen on CT 3/2023, also hospitalized for this " 11/2015. Gynecology discussed laparoscopy 2/2020 to evaluate for endometriosis.  -Asthma  as a child  -Seasonal allergies  -Hypokalemia March 2023, 2019, 2015  -March/April 2023 strep throat   -Headaches   -Genital HSV  -Palpitations   -COVID 10/2021, 12/2021, 8/2022    Past surgical history  [place] teeth removal,  EGD, colonoscopy     Social history:  Patient lives with her mother, stepfather, sister, and boyfriend.   She denies ever smoking, no alcohol or drug abuse.  Working weekly [employer]    Family history:  Mom, living thalassemia -chronic anemia, hysterectomy due to heavy bleeding  No other known hematology or cancer diagnosis is in her family.       Review of Systems:   System Review-All other systems including Neurologic, Cardiac, Gastrointestinal, Endocrine, Dermatologic, ENT, Respiratory, Infectious, Urologic, Musculoskeletal  have been reviewed and are negative for complaint outside of events noted below and within the assessment.      Allergies and Intolerances:       Allergies:               Allergies   Allergen Reactions    Lorazepam Unknown    Milk Unknown    Morphine Unknown         Outpatient Medication Profile:       Current Outpatient Medications   Medication Instructions    famotidine (Pepcid) 20 mg tablet oral    norethindrone (Aygestin) 5 mg tablet 1 tablet, oral, Daily    ondansetron (Zofran) 4 mg tablet 1 tablet, oral, Every 6 hours    triamcinolone (Kenalog) 0.5 % ointment 2 times daily,     APPLY SPARINGLY TO AFFECTED AREA(S)                Medical History:         Intestinal malabsorption: ICD-10: K90.9, Status: Active         Iron deficiency: ICD-10: E61.1, Status: Active         Colon polyp: ICD-10: K63.5, Status: Active         Hematuria: ICD-10: R31.9, Status: Active         Weight loss: ICD-10: R63.4, Status: Active         Fatigue: ICD-10: R53.83, Status: Active         Elevated blood protein: ICD-10: R77.9, Status: Active         Leukocytosis: ICD-10: D72.829, Status:  "Active         Family history of thalassemia: ICD-10: Z83.2, Status: Active         Shortness of breath at rest: ICD-10: R06.02, Status: Active         Lactose intolerance: ICD-10: E73.9, Status: Active         Major depressive disorder: ICD-10: F32.9, Status: Active        Performance:   ECOG Performance Status: 0 Fully Active     Physical Exam:  General: Patient is awake/alert/oriented x3, no distress, Nourished, hydrated, alert and cooperative  Psychological: A/O X3, intact recent and remote memory, judgement, and insight. Appropriate mood, affect, and behavior         11/6/2023     1:04 PM 11/6/2023     3:37 PM 11/6/2023     4:06 PM 1/15/2024     5:15 PM 1/15/2024     8:52 PM 2/2/2024     1:47 PM 4/25/2024     3:20 PM   Vitals   Systolic 126 136 125 129 124 110 115   Diastolic 73 55 56 85 83 73 81   Heart Rate 80 73 74 99 86 86 85   Temp 36.9 °C (98.4 °F) 36.8 °C (98.2 °F) 36.8 °C (98.2 °F) 36.8 °C (98.2 °F)  36.9 °C (98.5 °F)    Resp 18 16 16 18 18 18    Height (in)    1.676 m (5' 6\")  1.676 m (5' 6\")    Weight (lb) 163.14   160  170.1 178   BMI 26.33 kg/m2   25.82 kg/m2  27.45 kg/m2 28.73 kg/m2   BSA (m2) 1.86 m2   1.84 m2  1.9 m2 1.94 m2   Visit Report       Report      Lab Results:    Lab Results   Component Value Date    WBC 10.2 04/29/2024    NEUTROABS 5.77 04/29/2024    IGABSOL 0.03 04/29/2024    LYMPHSABS 3.38 04/29/2024    MONOSABS 0.67 04/29/2024    EOSABS 0.29 04/29/2024    BASOSABS 0.06 04/29/2024    RBC 4.67 04/29/2024    MCV 87 04/29/2024    MCHC 32.0 04/29/2024    HGB 13.0 04/29/2024    HCT 40.6 04/29/2024     04/29/2024     Lab Results   Component Value Date    RETICCTPCT 2.0 09/25/2023      Lab Results   Component Value Date    CREATININE 0.66 04/29/2024    BUN 12 04/29/2024    EGFR >90 04/29/2024     04/29/2024    K 3.9 04/29/2024     04/29/2024    CO2 29 04/29/2024      Lab Results   Component Value Date    ALT 11 04/29/2024    AST 13 04/29/2024    ALKPHOS 66 04/29/2024    " BILITOT 0.3 04/29/2024      Lab Results   Component Value Date    TSH 3.89 04/29/2024     Lab Results   Component Value Date    TSH 3.89 04/29/2024    THYROIDPAB 657 (H) 02/09/2024     Lab Results   Component Value Date    IRON 38 04/29/2024    TIBC 313 04/29/2024    FERRITIN 50 04/29/2024      Lab Results   Component Value Date    BBEGWJCK04 >2,000 (H) 04/29/2024      Lab Results   Component Value Date    FOLATE 15.4 02/09/2024     Lab Results   Component Value Date    SPEP NORMAL 06/15/2023     Radiology Result:  Impression:  No findings of an acute cardiopulmonary process.   Xray Chest 1 View [Jun 11 2023  9:54PM]  Impression:  No CT evidence of acute intracranialpathology.   CT Head without Contrast [Apr 2 2023  5:56PM]  Impression:  1. Ruptured right ovarian follicle.  2. Small to moderate amount of free fluid in the cul-de-sac.  3. Normal appendix.  4. No CT evidence of acute pancreatitis.   CT Abdomen and Pelvis with IV Contrast [Mar 24 2023  4:49PM]     Assessment and Plan:   Patient presents today for follow up. Iron TSAT 12 today and ferritin decreased at 50. Reports heavy mensis the chronic fatigue.  Additional new diagnosis of autoimmune gastritis. Following GI. She is receiving B12 injections through [facility] hematology group.  B12 level is over 2000, advised to discuss reducing weekly injections.   Encouraged her to continue healthy diet and hydration.    Hx iron infusions intermittently. No iron deficiency today. She requested other vitamin levels assessed due to chronic fatigue and hx.   She completes all her screenings.   She followed up with endocrinology who diagnosed her with Hashimoto's.  Will follow and have ultrasound of her thyroid.     Follow-up:     RTC:  --3-4 months with labs    Medication:  -IV Feraheme 510 mg x 2 doses 1 week apart        Referral:  --GI for bleeding hemorrhoids       Discussed labs in detail, answered questions addressed concerns, she agrees with plan of  care.  We will discuss further plan of care at following appointment.  She will call with any symptoms.       Thank you for allowing me to care for you today.     Sincerely,  Acacia Hutton, APRN-CNP       This note was written with voice recognition software if there is need for clarity please reach out.

## 2024-05-06 NOTE — TELEPHONE ENCOUNTER
Attempted to reach patient x2 regarding her need to schedule Iron infusions ordered by GUADALUPE Rooney. Left message for patient to call back at her earliest convenience.

## 2024-05-13 DIAGNOSIS — K29.40 AUTOIMMUNE GASTRITIS: Primary | ICD-10-CM

## 2024-05-13 DIAGNOSIS — D50.8 IRON DEFICIENCY ANEMIA SECONDARY TO INADEQUATE DIETARY IRON INTAKE: ICD-10-CM

## 2024-05-13 DIAGNOSIS — K90.49 MALABSORPTION DUE TO INTOLERANCE, NOT ELSEWHERE CLASSIFIED: ICD-10-CM

## 2024-05-13 RX ORDER — DIPHENHYDRAMINE HYDROCHLORIDE 50 MG/ML
50 INJECTION INTRAMUSCULAR; INTRAVENOUS AS NEEDED
OUTPATIENT
Start: 2024-05-20

## 2024-05-13 RX ORDER — ALBUTEROL SULFATE 0.83 MG/ML
3 SOLUTION RESPIRATORY (INHALATION) AS NEEDED
OUTPATIENT
Start: 2024-05-20

## 2024-05-13 RX ORDER — FAMOTIDINE 10 MG/ML
20 INJECTION INTRAVENOUS ONCE AS NEEDED
OUTPATIENT
Start: 2024-05-20

## 2024-05-13 RX ORDER — EPINEPHRINE 0.3 MG/.3ML
0.3 INJECTION SUBCUTANEOUS EVERY 5 MIN PRN
OUTPATIENT
Start: 2024-05-20

## 2024-05-20 ENCOUNTER — TELEPHONE (OUTPATIENT)
Dept: HEMATOLOGY/ONCOLOGY | Facility: HOSPITAL | Age: 24
End: 2024-05-20

## 2024-05-20 ENCOUNTER — APPOINTMENT (OUTPATIENT)
Dept: HEMATOLOGY/ONCOLOGY | Facility: HOSPITAL | Age: 24
End: 2024-05-20
Payer: COMMERCIAL

## 2024-05-20 NOTE — TELEPHONE ENCOUNTER
Attempted to reach patient regarding her infusion appt today. Patient was a no show. Left message for patient to call back at her earliest convenience.

## 2024-05-22 NOTE — TELEPHONE ENCOUNTER
Attempted x2 to reach patient regarding her appt on 5/20. Patient was a no show for her infusion. Left message for patient with call back number to call at her earliest convenience.

## 2024-05-27 ASSESSMENT — ENCOUNTER SYMPTOMS
NERVOUS/ANXIOUS: 1
DIZZINESS: 1
TROUBLE SWALLOWING: 1
NECK PAIN: 1
FATIGUE: 0
HEADACHES: 0
DIARRHEA: 1
NUMBNESS: 1
VOMITING: 0
DYSPHORIC MOOD: 1
NAUSEA: 0
WHEEZING: 1
COUGH: 1
TREMORS: 0
WEAKNESS: 0
PALPITATIONS: 1
FEVER: 0
SHORTNESS OF BREATH: 0
UNEXPECTED WEIGHT CHANGE: 1
ABDOMINAL PAIN: 0
CONSTIPATION: 1
BACK PAIN: 1

## 2024-05-28 NOTE — TELEPHONE ENCOUNTER
Attempted x3 to reach patient regarding her appt she no showed for last week. Left message for patient with call back number to call at her earliest convenience.

## 2024-05-28 NOTE — TELEPHONE ENCOUNTER
Patient called regarding her Iron infusions. Patient stated she needs to move all of her infusions. Patient rescheduled her appts to Karlene 10, 17 and 24. Patient verbalized and agreed to appointments.

## 2024-05-29 ENCOUNTER — APPOINTMENT (OUTPATIENT)
Dept: HEMATOLOGY/ONCOLOGY | Facility: HOSPITAL | Age: 24
End: 2024-05-29
Payer: COMMERCIAL

## 2024-05-31 NOTE — PROGRESS NOTES
HPI   23 yo presents for thyroid evaluation had seen Endo earlier this year. Pt with subclinical hashimoto's disease, autoimmune gastritis, b12 def.  -pt arrived 20 minutes past visit time     Pt with tsh-3.84, normal t4/t3, +tpo earlier this year, see labs below from 2024.  Thyroid ultrasound 4/2024: R 8mm TR4 nodule    Pt with fatigue/brain fog/wt regain.    Also with question of reactive hypoglycemia middle of the day.      Current Outpatient Medications:     cetirizine (ZyrTEC) 10 mg tablet, Take 1 tablet (10 mg) by mouth once daily., Disp: , Rfl:     copper gluconate 2 mg tablet, Take 2 mg by mouth once daily., Disp: , Rfl:     famotidine (Pepcid) 20 mg tablet, Take by mouth., Disp: , Rfl:     fluticasone (Flonase) 50 mcg/actuation nasal spray, Administer 2 sprays into each nostril once every 24 hours., Disp: , Rfl:     hydrOXYzine HCL (Atarax) 50 mg tablet, Take 1 tablet (50 mg) by mouth if needed., Disp: , Rfl:     ibuprofen 200 mg tablet, Take 3 tablets (600 mg) by mouth every 6 hours if needed., Disp: , Rfl:     ondansetron (Zofran) 4 mg tablet, Take 1 tablet (4 mg) by mouth every 6 hours., Disp: , Rfl:     pantoprazole (ProtoNix) 20 mg EC tablet, Take 1 tablet (20 mg) by mouth once daily in the morning. Take before meals. Do not crush, chew, or split., Disp: , Rfl:     sertraline (Zoloft) 25 mg tablet, Take 1 tablet (25 mg) by mouth once daily., Disp: , Rfl:     Sucraid 8,500 unit/mL solution, Take 2mL by mouth with every meal or snack up to 6 times per day, Disp: , Rfl:     traZODone (Desyrel) 50 mg tablet, Take 1 tablet (50 mg) by mouth once daily at bedtime., Disp: , Rfl:     triamcinolone (Kenalog) 0.5 % ointment, twice a day.  APPLY SPARINGLY TO AFFECTED AREA(S), Disp: , Rfl:     valACYclovir (Valtrex) 500 mg tablet, Take 1 tablet (500 mg) by mouth if needed., Disp: , Rfl:       Allergies as of 06/04/2024 - Reviewed 06/04/2024   Allergen Reaction Noted    House dust mite Other 02/24/2020    Lorazepam  "Unknown 11/24/2020    Milk Unknown 10/06/2023    Morphine Unknown 10/06/2023         Review of Systems   Cardiology: Lightheadedness-denies.  Chest pain-denies.  Leg edema-denies.  Palpitations-denies.  Respiratory: Cough-denies. Shortness of breath-denies.  Wheezing-denies.  Gastroenterology: Constipation-denies.  Diarrhea-denies.  Heartburn-denies.  Endocrinology: Cold intolerance-denies.  Heat intolerance-denies.  Sweats-denies.  Neurology: Headache-denies.  Tremor-denies.  Neuropathy in extremities-denies.  Psychology: Low energy +  Irritability-denies.  Sleep disturbances-denies.      /79 (BP Location: Left arm, Patient Position: Sitting)   Pulse 86   Wt 82.2 kg (181 lb 3.2 oz)   BMI 29.25 kg/m²       Labs:  Lab Results   Component Value Date    WBC 10.2 04/29/2024    NRBC 0.0 04/29/2024    RBC 4.67 04/29/2024    HGB 13.0 04/29/2024    HCT 40.6 04/29/2024     04/29/2024     Lab Results   Component Value Date    CALCIUM 9.0 04/29/2024    AST 13 04/29/2024    ALKPHOS 66 04/29/2024    BILITOT 0.3 04/29/2024    PROT 7.1 04/29/2024    ALBUMIN 4.7 04/29/2024     04/29/2024    K 3.9 04/29/2024     04/29/2024    CO2 29 04/29/2024    ANIONGAP 11 04/29/2024    BUN 12 04/29/2024    CREATININE 0.66 04/29/2024    GLUCOSE 92 04/29/2024    ALT 11 04/29/2024    EGFR >90 04/29/2024       Lab Results   Component Value Date    TSH 3.89 04/29/2024     Lab Results   Component Value Date    ZZEGSNUK57 >2,000 (H) 04/29/2024     No results found for: \"HGBA1C\"      Physical Exam   General Appearance: pleasant, cooperative, no acute distress  HEENT: no chemosis, no proptosis, no lid lag, no lid retraction  Neck: no lymphadenopathy, no thyromegaly, no dominant thyroid nodules  Heart: no murmurs, regular rate and rhythm, S1 and S2  Lungs: no wheezes, no rhonci, no rales  Extremities: no lower extremity swelling      Assessment/Plan   1. Thyroid antibody positive  -given borderline enlargement of thyroid and sx " of hypothyroidism can give a trial of levothyroxine 50 mcg at bedtime  -I told pt while her labs should improve it's doubtful her sx will change, pt would like a trial of medication to see  -repeat labs prior to 3 month follow up and decide whether to continue medication    2. Thyroid nodule  -repeat thyroid ultrasound about 6 months after initial scan, if stable can follow with yearly clinical neck exam    3. Reactive hypoglycemia  -higher protein/lower carb diet, perhaps a midday protein snack  -record when sx happen in relation to eating and record what types of food eaten  -work with our educator next visit on diet      Follow Up:  Cailin 3 months    -labs/tests/notes reviewed  -reviewed and counseled patient on medication monitoring and side effects

## 2024-06-04 ENCOUNTER — OFFICE VISIT (OUTPATIENT)
Dept: ENDOCRINOLOGY | Facility: CLINIC | Age: 24
End: 2024-06-04
Payer: COMMERCIAL

## 2024-06-04 VITALS
SYSTOLIC BLOOD PRESSURE: 109 MMHG | HEART RATE: 86 BPM | BODY MASS INDEX: 29.25 KG/M2 | DIASTOLIC BLOOD PRESSURE: 79 MMHG | WEIGHT: 181.2 LBS

## 2024-06-04 DIAGNOSIS — R76.8 THYROID ANTIBODY POSITIVE: Primary | ICD-10-CM

## 2024-06-04 DIAGNOSIS — E04.1 THYROID NODULE: ICD-10-CM

## 2024-06-04 DIAGNOSIS — E16.1 REACTIVE HYPOGLYCEMIA: ICD-10-CM

## 2024-06-04 PROCEDURE — 99214 OFFICE O/P EST MOD 30 MIN: CPT | Performed by: INTERNAL MEDICINE

## 2024-06-04 PROCEDURE — 1036F TOBACCO NON-USER: CPT | Performed by: INTERNAL MEDICINE

## 2024-06-04 RX ORDER — LEVOTHYROXINE SODIUM 50 UG/1
TABLET ORAL
Qty: 30 TABLET | Refills: 5 | Status: SHIPPED | OUTPATIENT
Start: 2024-06-04

## 2024-06-04 ASSESSMENT — ENCOUNTER SYMPTOMS: DEPRESSION: 0

## 2024-06-04 ASSESSMENT — PAIN SCALES - GENERAL: PAINLEVEL: 0-NO PAIN

## 2024-06-05 ENCOUNTER — APPOINTMENT (OUTPATIENT)
Dept: HEMATOLOGY/ONCOLOGY | Facility: HOSPITAL | Age: 24
End: 2024-06-05
Payer: COMMERCIAL

## 2024-06-10 ENCOUNTER — APPOINTMENT (OUTPATIENT)
Dept: HEMATOLOGY/ONCOLOGY | Facility: HOSPITAL | Age: 24
End: 2024-06-10
Payer: COMMERCIAL

## 2024-06-10 ENCOUNTER — TELEPHONE (OUTPATIENT)
Dept: HEMATOLOGY/ONCOLOGY | Facility: HOSPITAL | Age: 24
End: 2024-06-10
Payer: COMMERCIAL

## 2024-06-10 NOTE — TELEPHONE ENCOUNTER
Attempted to reach out to patient regarding her Iron infusion that she was scheduled for patient has cancelled her infusion through my chart again. Left message for patient with call back number and sent secure chat to provider making her aware.

## 2024-06-11 NOTE — TELEPHONE ENCOUNTER
Attempted to reach patient x2 regarding her appointment she cancelled yesterday for an Iron infusion. Left message for patient with call back number to call at her earliest convenience.

## 2024-06-13 NOTE — TELEPHONE ENCOUNTER
Patient message sent on 6/12/204 to the patient regarding her cancelled Iron Infusion on 6/10/2024.

## 2024-06-17 NOTE — TELEPHONE ENCOUNTER
Attempted to reach patient regarding her infusion appt for today. Patient was a no show. Left message for patient with call back number to call at her earliest convenience.

## 2024-06-19 NOTE — TELEPHONE ENCOUNTER
Attempted to reach patient regarding her Iron Infusion that she no showed for on 6/17. Left message for patient with call back number to call at her earliest convenience.

## 2024-06-24 ENCOUNTER — APPOINTMENT (OUTPATIENT)
Dept: HEMATOLOGY/ONCOLOGY | Facility: HOSPITAL | Age: 24
End: 2024-06-24
Payer: COMMERCIAL

## 2024-06-24 NOTE — TELEPHONE ENCOUNTER
Receieved a my chart request for the patients appt for today to be moved. Attempted x2 today to reach patient regarding moving her appt. Left message for patient with call back number to call at her earliest convenience.

## 2024-06-25 NOTE — TELEPHONE ENCOUNTER
Patient called back 6/24 to cancel appt for the day patient is rescheduled for 6/27 for her Iron Infusion.

## 2024-06-26 ENCOUNTER — TELEPHONE (OUTPATIENT)
Dept: HEMATOLOGY/ONCOLOGY | Facility: HOSPITAL | Age: 24
End: 2024-06-26
Payer: COMMERCIAL

## 2024-06-27 ENCOUNTER — INFUSION (OUTPATIENT)
Dept: HEMATOLOGY/ONCOLOGY | Facility: HOSPITAL | Age: 24
End: 2024-06-27
Payer: COMMERCIAL

## 2024-06-27 VITALS
DIASTOLIC BLOOD PRESSURE: 68 MMHG | BODY MASS INDEX: 29.18 KG/M2 | SYSTOLIC BLOOD PRESSURE: 119 MMHG | WEIGHT: 180.78 LBS | OXYGEN SATURATION: 98 % | TEMPERATURE: 98.1 F | HEART RATE: 69 BPM | RESPIRATION RATE: 18 BRPM

## 2024-06-27 DIAGNOSIS — K29.40 AUTOIMMUNE GASTRITIS: ICD-10-CM

## 2024-06-27 DIAGNOSIS — D50.8 IRON DEFICIENCY ANEMIA SECONDARY TO INADEQUATE DIETARY IRON INTAKE: ICD-10-CM

## 2024-06-27 DIAGNOSIS — K90.49 MALABSORPTION DUE TO INTOLERANCE, NOT ELSEWHERE CLASSIFIED: ICD-10-CM

## 2024-06-27 PROCEDURE — 2500000004 HC RX 250 GENERAL PHARMACY W/ HCPCS (ALT 636 FOR OP/ED): Mod: SE

## 2024-06-27 PROCEDURE — 96365 THER/PROPH/DIAG IV INF INIT: CPT | Mod: INF

## 2024-06-27 RX ORDER — ALBUTEROL SULFATE 0.83 MG/ML
3 SOLUTION RESPIRATORY (INHALATION) AS NEEDED
OUTPATIENT
Start: 2024-07-04

## 2024-06-27 RX ORDER — HEPARIN 100 UNIT/ML
500 SYRINGE INTRAVENOUS AS NEEDED
OUTPATIENT
Start: 2024-06-27

## 2024-06-27 RX ORDER — EPINEPHRINE 0.3 MG/.3ML
0.3 INJECTION SUBCUTANEOUS EVERY 5 MIN PRN
OUTPATIENT
Start: 2024-07-04

## 2024-06-27 RX ORDER — DIPHENHYDRAMINE HYDROCHLORIDE 50 MG/ML
50 INJECTION INTRAMUSCULAR; INTRAVENOUS AS NEEDED
OUTPATIENT
Start: 2024-07-04

## 2024-06-27 RX ORDER — HEPARIN SODIUM,PORCINE/PF 10 UNIT/ML
50 SYRINGE (ML) INTRAVENOUS AS NEEDED
OUTPATIENT
Start: 2024-06-27

## 2024-06-27 RX ORDER — FAMOTIDINE 10 MG/ML
20 INJECTION INTRAVENOUS ONCE AS NEEDED
OUTPATIENT
Start: 2024-07-04

## 2024-07-01 ENCOUNTER — APPOINTMENT (OUTPATIENT)
Dept: HEMATOLOGY/ONCOLOGY | Facility: HOSPITAL | Age: 24
End: 2024-07-01
Payer: COMMERCIAL

## 2024-07-02 ENCOUNTER — TELEPHONE (OUTPATIENT)
Dept: HEMATOLOGY/ONCOLOGY | Facility: HOSPITAL | Age: 24
End: 2024-07-02
Payer: COMMERCIAL

## 2024-07-02 NOTE — TELEPHONE ENCOUNTER
Patient called regarding her Iron Infusion for this week. Patient states she is to busy to come in this week for an Iron Infusion. Spoke to KO Hutton regarding this to make sure it is okay to hold off second treatment until next  week. Patient will be rescheduled to July 8th @ 10:30 am. Patient verbalized and agreed to appointment change.

## 2024-07-03 ENCOUNTER — APPOINTMENT (OUTPATIENT)
Dept: HEMATOLOGY/ONCOLOGY | Facility: HOSPITAL | Age: 24
End: 2024-07-03
Payer: COMMERCIAL

## 2024-07-05 ENCOUNTER — APPOINTMENT (OUTPATIENT)
Dept: HEMATOLOGY/ONCOLOGY | Facility: HOSPITAL | Age: 24
End: 2024-07-05
Payer: COMMERCIAL

## 2024-07-08 ENCOUNTER — APPOINTMENT (OUTPATIENT)
Dept: HEMATOLOGY/ONCOLOGY | Facility: HOSPITAL | Age: 24
End: 2024-07-08
Payer: COMMERCIAL

## 2024-07-09 ENCOUNTER — TELEPHONE (OUTPATIENT)
Dept: HEMATOLOGY/ONCOLOGY | Facility: HOSPITAL | Age: 24
End: 2024-07-09

## 2024-07-09 NOTE — TELEPHONE ENCOUNTER
Attempted to reach patient regarding her Iron infusion for today 7/9/2024 @ 10:30 am. Patient no showed appointment. Left message for patient with call back number to call at her earliest convenience.

## 2024-07-10 NOTE — TELEPHONE ENCOUNTER
Attempted to reach patient regarding her missed Iron Infusion yesterday 7/9. Left message for patient with call back number to call at her earliest convenience.

## 2024-07-11 ENCOUNTER — APPOINTMENT (OUTPATIENT)
Dept: HEMATOLOGY/ONCOLOGY | Facility: HOSPITAL | Age: 24
End: 2024-07-11
Payer: COMMERCIAL

## 2024-07-12 NOTE — TELEPHONE ENCOUNTER
Attempted to reach patient x3 regarding her missed Iron Infusion on 7/9/24. Left message for patient with call back number to call at her earliest convenience. Patient is due for another infusion on 7/16.

## 2024-07-15 NOTE — TELEPHONE ENCOUNTER
Attempted to reach patient x4 regarding her appointment she no showed for on 7/9/24. Patient is scheduled for an appointment tomorrow 7/16/24. Left message for patient with call back number to call at her earliest convenience.

## 2024-07-16 NOTE — TELEPHONE ENCOUNTER
Attempted to reach patient today for her scheduled Iron infusion. Patient was a no show for her Iron Infusion last week 7/9 and today 7/16. Left message for patient with call back number to call back at her earliest convenience will send a ATR letter to patient today. Secure Chat sent to Aneta LERNER regarding the no show.

## 2024-08-01 ENCOUNTER — LAB (OUTPATIENT)
Dept: LAB | Facility: LAB | Age: 24
End: 2024-08-01
Payer: COMMERCIAL

## 2024-08-01 DIAGNOSIS — K29.40 AUTOIMMUNE GASTRITIS: ICD-10-CM

## 2024-08-01 DIAGNOSIS — K90.49 MALABSORPTION DUE TO INTOLERANCE, NOT ELSEWHERE CLASSIFIED: ICD-10-CM

## 2024-08-01 DIAGNOSIS — D50.8 IRON DEFICIENCY ANEMIA SECONDARY TO INADEQUATE DIETARY IRON INTAKE: ICD-10-CM

## 2024-08-01 LAB
ALBUMIN SERPL BCP-MCNC: 4.9 G/DL (ref 3.4–5)
ALP SERPL-CCNC: 61 U/L (ref 33–110)
ALT SERPL W P-5'-P-CCNC: 8 U/L (ref 7–45)
ANION GAP SERPL CALC-SCNC: 12 MMOL/L (ref 10–20)
AST SERPL W P-5'-P-CCNC: 12 U/L (ref 9–39)
BASOPHILS # BLD AUTO: 0.06 X10*3/UL (ref 0–0.1)
BASOPHILS NFR BLD AUTO: 0.6 %
BILIRUB SERPL-MCNC: 0.5 MG/DL (ref 0–1.2)
BUN SERPL-MCNC: 11 MG/DL (ref 6–23)
CALCIUM SERPL-MCNC: 9.7 MG/DL (ref 8.6–10.3)
CHLORIDE SERPL-SCNC: 100 MMOL/L (ref 98–107)
CO2 SERPL-SCNC: 29 MMOL/L (ref 21–32)
CREAT SERPL-MCNC: 0.64 MG/DL (ref 0.5–1.05)
EGFRCR SERPLBLD CKD-EPI 2021: >90 ML/MIN/1.73M*2
EOSINOPHIL # BLD AUTO: 0.32 X10*3/UL (ref 0–0.7)
EOSINOPHIL NFR BLD AUTO: 3.1 %
ERYTHROCYTE [DISTWIDTH] IN BLOOD BY AUTOMATED COUNT: 12.7 % (ref 11.5–14.5)
FERRITIN SERPL-MCNC: 78 NG/ML (ref 8–150)
GLUCOSE SERPL-MCNC: 87 MG/DL (ref 74–99)
HCT VFR BLD AUTO: 40.7 % (ref 36–46)
HGB BLD-MCNC: 13.5 G/DL (ref 12–16)
IMM GRANULOCYTES # BLD AUTO: 0.04 X10*3/UL (ref 0–0.7)
IMM GRANULOCYTES NFR BLD AUTO: 0.4 % (ref 0–0.9)
IRON SATN MFR SERPL: 24 % (ref 25–45)
IRON SERPL-MCNC: 73 UG/DL (ref 35–150)
LYMPHOCYTES # BLD AUTO: 3.12 X10*3/UL (ref 1.2–4.8)
LYMPHOCYTES NFR BLD AUTO: 30.2 %
MCH RBC QN AUTO: 28.2 PG (ref 26–34)
MCHC RBC AUTO-ENTMCNC: 33.2 G/DL (ref 32–36)
MCV RBC AUTO: 85 FL (ref 80–100)
MONOCYTES # BLD AUTO: 0.8 X10*3/UL (ref 0.1–1)
MONOCYTES NFR BLD AUTO: 7.8 %
NEUTROPHILS # BLD AUTO: 5.98 X10*3/UL (ref 1.2–7.7)
NEUTROPHILS NFR BLD AUTO: 57.9 %
NRBC BLD-RTO: 0 /100 WBCS (ref 0–0)
PLATELET # BLD AUTO: 345 X10*3/UL (ref 150–450)
POTASSIUM SERPL-SCNC: 3.7 MMOL/L (ref 3.5–5.3)
PROT SERPL-MCNC: 7.6 G/DL (ref 6.4–8.2)
RBC # BLD AUTO: 4.79 X10*6/UL (ref 4–5.2)
SODIUM SERPL-SCNC: 137 MMOL/L (ref 136–145)
TIBC SERPL-MCNC: 305 UG/DL (ref 240–445)
UIBC SERPL-MCNC: 232 UG/DL (ref 110–370)
WBC # BLD AUTO: 10.3 X10*3/UL (ref 4.4–11.3)

## 2024-08-01 PROCEDURE — 36415 COLL VENOUS BLD VENIPUNCTURE: CPT

## 2024-08-01 PROCEDURE — 82728 ASSAY OF FERRITIN: CPT

## 2024-08-01 PROCEDURE — 85025 COMPLETE CBC W/AUTO DIFF WBC: CPT

## 2024-08-01 PROCEDURE — 80053 COMPREHEN METABOLIC PANEL: CPT

## 2024-08-01 PROCEDURE — 83540 ASSAY OF IRON: CPT

## 2024-08-01 PROCEDURE — 83550 IRON BINDING TEST: CPT

## 2024-08-02 ENCOUNTER — APPOINTMENT (OUTPATIENT)
Dept: HEMATOLOGY/ONCOLOGY | Facility: HOSPITAL | Age: 24
End: 2024-08-02
Payer: COMMERCIAL

## 2024-08-02 ENCOUNTER — OFFICE VISIT (OUTPATIENT)
Dept: HEMATOLOGY/ONCOLOGY | Facility: HOSPITAL | Age: 24
End: 2024-08-02
Payer: COMMERCIAL

## 2024-08-02 VITALS
BODY MASS INDEX: 29.36 KG/M2 | SYSTOLIC BLOOD PRESSURE: 115 MMHG | RESPIRATION RATE: 18 BRPM | HEART RATE: 74 BPM | WEIGHT: 181.9 LBS | OXYGEN SATURATION: 97 % | TEMPERATURE: 97.1 F | DIASTOLIC BLOOD PRESSURE: 78 MMHG

## 2024-08-02 DIAGNOSIS — K90.49 MALABSORPTION DUE TO INTOLERANCE, NOT ELSEWHERE CLASSIFIED: ICD-10-CM

## 2024-08-02 DIAGNOSIS — K29.40 AUTOIMMUNE GASTRITIS: Primary | ICD-10-CM

## 2024-08-02 DIAGNOSIS — E55.9 VITAMIN D3 DEFICIENCY: ICD-10-CM

## 2024-08-02 DIAGNOSIS — D50.8 IRON DEFICIENCY ANEMIA SECONDARY TO INADEQUATE DIETARY IRON INTAKE: ICD-10-CM

## 2024-08-02 PROCEDURE — 99214 OFFICE O/P EST MOD 30 MIN: CPT

## 2024-08-02 ASSESSMENT — ENCOUNTER SYMPTOMS
DEPRESSION: 0
OCCASIONAL FEELINGS OF UNSTEADINESS: 0
LOSS OF SENSATION IN FEET: 0

## 2024-08-02 ASSESSMENT — PAIN SCALES - GENERAL: PAINLEVEL: 0-NO PAIN

## 2024-08-02 NOTE — PROGRESS NOTES
Patient Visit Information:   Visit Type: Follow up Visit       I performed this visit using real-time telehealth tools, including an audio/video connection between (Elizabeth Toro at her home) and (Acacia Hutton, CNP at [facility])  Patient consents to telemedicine service today and understands the limitations of the visit, no  physical examination and all issues may not be able to be addressed today, other than brief neuro and psych assessment.    PCP: Dr. Sid Staley, at [facility]    History of Present Illness:     Hematology History:  Patient has had a low ferritin since 2018, tried oral iron but had GI intolerance, 2019 EGD and colonoscopy with a polyp removed otherwise no findings and had a second colonoscopy shortly after that because of bleeding status post staples, no red meat since 2020 d/t intolerance, no pica, no diagnosis of celiac disease, no use of a PPI.  She has had in the toilet after bowel movements for the past several weeks, she relates to her hemorrhoids, new occurrence following gastroenterologist December 2023.  GI has treated her previously for this issue. Treatment for H. pylori March 2023 by nurse practitioner Bubba Contreras but had intolerance to the medication, also has strep throat treated March 2023. She has finished her H. pylori treatments. Consult new GI and she was advised to discontinue PPI per her report.  ER visit in April 2023 regarding anxiety, CT head no acute abnormality.   Patient has anxiety and depression, followed by psychiatry, mood doing okay right now although her medications may be adjusted due to dizziness.  She is on Zoloft and follows psychiatry. On trazodone and PPI. Feels multiple factors including.   Hemorrhoids. Sugar issues-hypoglycemia. No bruise or bleeding.   Last period 4/24/2024.   She has heavy menses changing her feminine product every 2-3 hours, follows with gynecology who is considering endometriosis with further evaluation pending, took OCPs in the past  "without benefit. Her last menstrual started 8 days ago and lasted 5 days. Chronic fluctuating diarrhea and constipation diagnosed with irritable bowel syndrome/disease, intermittent abdominal pain.  She has chronic sweats for years.  She has chronic headaches which are ongoing and being evaluated  by neurology with CT brain and neck 04/2023 normal. Saw a private neurologist did MRI was normal per her report.  She has seen a cardiologist because of some intermittent chest pain and palpitations. She wore a heart monitor and had a echo and stress test all negative. Reports \"skipping beat still.\" Follow PRN. She follows with an allergist/immunologist for history of multiple allergies. She saw vascular surgery for her Raynaud's ml, post covid, vascular response. Follow[facility] H[facility] at [facility], labs in July 2023. Receives B12 injections weekly through them.     ID Statement:    RUFINA EDWARDS is a 24 year old Female     Chief Complaint: Follow-up regarding ANTOINE and several vitamin Deficiencies    Interval History:    Patient is a 24-year-old  female who presents for follow-up visit.  Family history of thalassemia and personal history of low ferritin, copper, vitamin b12 and folate.     She states she been feeling pretty good lately although she did have COVID over the last couple weeks.  She states it was a lot with her autoimmune disease.  She is working 3 jobs.  Patient denies any fevers, lymphadenopathy, recent or recurrent infections, edema, cough, dysuria or hematuria, pain or neurologic symptoms except as above, rashes or lumps, abnormal bleeding except as above, diabetes or thyroid disorder.  Appetite stable and weight stable. Drinks plenty of fluid.   She is following infusion center in [place] getting B12 injections.  She would like her vitamin D and folic acid checked.     Past medical history  -Anemia  -April 2023 hematuria-Per patient no menses at the time  -Depression and anxiety  -H. pylori in March 2023 "   -Ruptured ovarian cyst seen on CT 3/2023, also hospitalized for this 11/2015. Gynecology discussed laparoscopy 2/2020 to evaluate for endometriosis.  -Asthma  as a child  -Seasonal allergies  -Hypokalemia March 2023, 2019, 2015  -March/April 2023 strep throat   -Headaches   -Genital HSV  -Palpitations   -COVID 10/2021, 12/2021, 8/2022    Past surgical history  [place] teeth removal,  EGD, colonoscopy     Social history:  Patient lives with her mother, stepfather, sister, and boyfriend.   She denies ever smoking, no alcohol or drug abuse.  Working weekly [employer]    Family history:  Mom, living thalassemia -chronic anemia, hysterectomy due to heavy bleeding  No other known hematology or cancer diagnosis is in her family.       Review of Systems:   System Review-All other systems including Neurologic, Cardiac, Gastrointestinal, Endocrine, Dermatologic, ENT, Respiratory, Infectious, Urologic, Musculoskeletal  have been reviewed and are negative for complaint outside of events noted below and within the assessment.      Allergies and Intolerances:       Allergies:               Allergies   Allergen Reactions    Lorazepam Unknown    Milk Unknown    Morphine Unknown         Outpatient Medication Profile:       Current Outpatient Medications   Medication Instructions    famotidine (Pepcid) 20 mg tablet oral    norethindrone (Aygestin) 5 mg tablet 1 tablet, oral, Daily    ondansetron (Zofran) 4 mg tablet 1 tablet, oral, Every 6 hours    triamcinolone (Kenalog) 0.5 % ointment 2 times daily,     APPLY SPARINGLY TO AFFECTED AREA(S)                Medical History:         Intestinal malabsorption: ICD-10: K90.9, Status: Active         Iron deficiency: ICD-10: E61.1, Status: Active         Colon polyp: ICD-10: K63.5, Status: Active         Hematuria: ICD-10: R31.9, Status: Active         Weight loss: ICD-10: R63.4, Status: Active         Fatigue: ICD-10: R53.83, Status: Active         Elevated blood protein: ICD-10:  R77.9, Status: Active         Leukocytosis: ICD-10: D72.829, Status: Active         Family history of thalassemia: ICD-10: Z83.2, Status: Active         Shortness of breath at rest: ICD-10: R06.02, Status: Active         Lactose intolerance: ICD-10: E73.9, Status: Active         Major depressive disorder: ICD-10: F32.9, Status: Active        Performance:   ECOG Performance Status: 0 Fully Active     Physical Exam:  Physical Exam  Constitutional:       Appearance: Normal appearance.   HENT:      Head: Normocephalic.      Right Ear: External ear normal.      Left Ear: External ear normal.      Nose: Nose normal.      Mouth/Throat:      Mouth: Mucous membranes are moist.      Pharynx: Oropharynx is clear.   Eyes:      Conjunctiva/sclera: Conjunctivae normal.   Cardiovascular:      Rate and Rhythm: Normal rate and regular rhythm.      Pulses: Normal pulses.      Heart sounds: Normal heart sounds.   Pulmonary:      Effort: Pulmonary effort is normal.      Breath sounds: Normal breath sounds.   Abdominal:      General: Abdomen is flat.      Palpations: Abdomen is soft.   Musculoskeletal:         General: Normal range of motion.      Cervical back: Normal range of motion and neck supple.   Skin:     General: Skin is warm and dry.      Capillary Refill: Capillary refill takes less than 2 seconds.   Neurological:      General: No focal deficit present.      Mental Status: She is alert and oriented to person, place, and time.   Psychiatric:         Mood and Affect: Mood normal.         Behavior: Behavior normal.         Thought Content: Thought content normal.         Judgment: Judgment normal.            2/2/2024     1:47 PM 4/25/2024     3:20 PM 6/4/2024    11:17 AM 6/27/2024     9:02 AM 6/27/2024    11:07 AM 6/27/2024    11:35 AM 8/2/2024     3:00 PM   Vitals   Systolic 110 115 109 134 113 119 115   Diastolic 73 81 79 72 67 68 78   Heart Rate 86 85 86 81 78 69 74   Temp 36.9 °C (98.5 °F)   36.4 °C (97.5 °F) 36.6 °C (97.9  "°F) 36.7 °C (98.1 °F) 36.2 °C (97.1 °F)   Resp 18   18 18 18 18   Height (in) 1.676 m (5' 6\")         Weight (lb) 170.1 178 181.2 180.78   181.9   BMI 27.45 kg/m2 28.73 kg/m2 29.25 kg/m2 29.18 kg/m2   29.36 kg/m2   BSA (m2) 1.9 m2 1.94 m2 1.96 m2 1.95 m2   1.96 m2   Visit Report  Report Report    Report      Lab Results:    Lab Results   Component Value Date    WBC 10.3 08/01/2024    NEUTROABS 5.98 08/01/2024    IGABSOL 0.04 08/01/2024    LYMPHSABS 3.12 08/01/2024    MONOSABS 0.80 08/01/2024    EOSABS 0.32 08/01/2024    BASOSABS 0.06 08/01/2024    RBC 4.79 08/01/2024    MCV 85 08/01/2024    MCHC 33.2 08/01/2024    HGB 13.5 08/01/2024    HCT 40.7 08/01/2024     08/01/2024     Lab Results   Component Value Date    RETICCTPCT 2.0 09/25/2023      Lab Results   Component Value Date    CREATININE 0.64 08/01/2024    BUN 11 08/01/2024    EGFR >90 08/01/2024     08/01/2024    K 3.7 08/01/2024     08/01/2024    CO2 29 08/01/2024      Lab Results   Component Value Date    ALT 8 08/01/2024    AST 12 08/01/2024    ALKPHOS 61 08/01/2024    BILITOT 0.5 08/01/2024      Lab Results   Component Value Date    TSH 3.89 04/29/2024     Lab Results   Component Value Date    TSH 3.89 04/29/2024    THYROIDPAB 657 (H) 02/09/2024     Lab Results   Component Value Date    IRON 73 08/01/2024    TIBC 305 08/01/2024    FERRITIN 78 08/01/2024      Lab Results   Component Value Date    OSORLGNG76 >2,000 (H) 04/29/2024      Lab Results   Component Value Date    FOLATE 15.4 02/09/2024     Lab Results   Component Value Date    SPEP NORMAL 06/15/2023     Radiology Result:  Impression:  No findings of an acute cardiopulmonary process.   Xray Chest 1 View [Jun 11 2023  9:54PM]  Impression:  No CT evidence of acute intracranialpathology.   CT Head without Contrast [Apr 2 2023  5:56PM]  Impression:  1. Ruptured right ovarian follicle.  2. Small to moderate amount of free fluid in the cul-de-sac.  3. Normal appendix.  4. No CT evidence " of acute pancreatitis.   CT Abdomen and Pelvis with IV Contrast [Mar 24 2023  4:49PM]     Assessment and Plan:   Patient presents today for follow up. Iron TSAT 12 today and ferritin decreased at 50. Reports heavy mensis the chronic fatigue.  Additional new diagnosis of autoimmune gastritis. Following GI. She is receiving B12 injections through [facility].  B12 level is over 2000, advised to discuss reducing weekly injections.   Encouraged her to continue healthy diet and hydration.    Hx iron infusions intermittently. No iron deficiency today. She requested other vitamin levels assessed due to chronic fatigue and hx.   She completes all her screenings.   She followed up with endocrinology who diagnosed her with Hashimoto's.  Will follow and have ultrasound of her thyroid.     Follow-up:     RTC:  --3 months with labs  - 6 months visit with labs    Medication:  -IV Venofer x1 dose        Referral/other apts:  -endo 9/6/2024       Discussed labs in detail, answered questions addressed concerns, she agrees with plan of care.  We will discuss further plan of care at following appointment.  She will call with any symptoms.       Thank you for allowing me to care for you today.     Sincerely,  Acacia Hutton, APRN-CNP       This note was written with voice recognition software if there is need for clarity please reach out.

## 2024-08-08 ENCOUNTER — TELEPHONE (OUTPATIENT)
Dept: HEMATOLOGY/ONCOLOGY | Facility: HOSPITAL | Age: 24
End: 2024-08-08
Payer: COMMERCIAL

## 2024-08-08 NOTE — TELEPHONE ENCOUNTER
Patient called regarding her Iron Infusion that is scheduled for Monday August 12th @ 12:00 PM. Patient states she now has a scheduling conflict and needs to move her Iron Infusion. Rescheduled patient for August 26th @ 10:00 AM in [place]. Patient verbalized and agreed to this change.

## 2024-08-12 ENCOUNTER — APPOINTMENT (OUTPATIENT)
Dept: HEMATOLOGY/ONCOLOGY | Facility: HOSPITAL | Age: 24
End: 2024-08-12
Payer: COMMERCIAL

## 2024-08-26 ENCOUNTER — INFUSION (OUTPATIENT)
Dept: HEMATOLOGY/ONCOLOGY | Facility: HOSPITAL | Age: 24
End: 2024-08-26
Payer: COMMERCIAL

## 2024-08-26 VITALS
HEART RATE: 64 BPM | OXYGEN SATURATION: 98 % | TEMPERATURE: 97.7 F | DIASTOLIC BLOOD PRESSURE: 62 MMHG | RESPIRATION RATE: 16 BRPM | SYSTOLIC BLOOD PRESSURE: 130 MMHG

## 2024-08-26 DIAGNOSIS — K90.49 MALABSORPTION DUE TO INTOLERANCE, NOT ELSEWHERE CLASSIFIED: ICD-10-CM

## 2024-08-26 DIAGNOSIS — K29.40 AUTOIMMUNE GASTRITIS: ICD-10-CM

## 2024-08-26 DIAGNOSIS — D50.8 IRON DEFICIENCY ANEMIA SECONDARY TO INADEQUATE DIETARY IRON INTAKE: ICD-10-CM

## 2024-08-26 PROCEDURE — 96366 THER/PROPH/DIAG IV INF ADDON: CPT

## 2024-08-26 PROCEDURE — 2500000004 HC RX 250 GENERAL PHARMACY W/ HCPCS (ALT 636 FOR OP/ED): Mod: SE

## 2024-08-26 PROCEDURE — 96365 THER/PROPH/DIAG IV INF INIT: CPT | Mod: INF

## 2024-08-26 RX ORDER — EPINEPHRINE 0.3 MG/.3ML
0.3 INJECTION SUBCUTANEOUS EVERY 5 MIN PRN
OUTPATIENT
Start: 2024-08-26

## 2024-08-26 RX ORDER — FAMOTIDINE 10 MG/ML
20 INJECTION INTRAVENOUS ONCE AS NEEDED
OUTPATIENT
Start: 2024-08-26

## 2024-08-26 RX ORDER — HEPARIN 100 UNIT/ML
500 SYRINGE INTRAVENOUS AS NEEDED
OUTPATIENT
Start: 2024-08-26

## 2024-08-26 RX ORDER — ALBUTEROL SULFATE 0.83 MG/ML
3 SOLUTION RESPIRATORY (INHALATION) AS NEEDED
OUTPATIENT
Start: 2024-08-26

## 2024-08-26 RX ORDER — DIPHENHYDRAMINE HYDROCHLORIDE 50 MG/ML
50 INJECTION INTRAMUSCULAR; INTRAVENOUS AS NEEDED
OUTPATIENT
Start: 2024-08-26

## 2024-08-26 RX ORDER — HEPARIN SODIUM,PORCINE/PF 10 UNIT/ML
50 SYRINGE (ML) INTRAVENOUS AS NEEDED
OUTPATIENT
Start: 2024-08-26

## 2024-09-06 ENCOUNTER — APPOINTMENT (OUTPATIENT)
Dept: ENDOCRINOLOGY | Facility: CLINIC | Age: 24
End: 2024-09-06
Payer: COMMERCIAL

## 2024-10-21 ENCOUNTER — APPOINTMENT (OUTPATIENT)
Dept: ENDOCRINOLOGY | Facility: CLINIC | Age: 24
End: 2024-10-21
Payer: COMMERCIAL

## 2024-10-22 ENCOUNTER — HOSPITAL ENCOUNTER (EMERGENCY)
Facility: HOSPITAL | Age: 24
Discharge: HOME | End: 2024-10-22
Attending: EMERGENCY MEDICINE
Payer: COMMERCIAL

## 2024-10-22 ENCOUNTER — APPOINTMENT (OUTPATIENT)
Dept: RADIOLOGY | Facility: HOSPITAL | Age: 24
End: 2024-10-22
Payer: COMMERCIAL

## 2024-10-22 VITALS
WEIGHT: 180 LBS | RESPIRATION RATE: 18 BRPM | BODY MASS INDEX: 28.93 KG/M2 | SYSTOLIC BLOOD PRESSURE: 133 MMHG | HEIGHT: 66 IN | OXYGEN SATURATION: 99 % | DIASTOLIC BLOOD PRESSURE: 84 MMHG | HEART RATE: 84 BPM | TEMPERATURE: 97.2 F

## 2024-10-22 DIAGNOSIS — K59.00 CONSTIPATION, UNSPECIFIED CONSTIPATION TYPE: Primary | ICD-10-CM

## 2024-10-22 DIAGNOSIS — R10.84 GENERALIZED ABDOMINAL PAIN: ICD-10-CM

## 2024-10-22 LAB
ALBUMIN SERPL BCP-MCNC: 5 G/DL (ref 3.4–5)
ALP SERPL-CCNC: 65 U/L (ref 33–110)
ALT SERPL W P-5'-P-CCNC: 11 U/L (ref 7–45)
ANION GAP SERPL CALC-SCNC: 15 MMOL/L (ref 10–20)
APPEARANCE UR: CLEAR
AST SERPL W P-5'-P-CCNC: 13 U/L (ref 9–39)
B-HCG SERPL-ACNC: <2 MIU/ML
BASOPHILS # BLD AUTO: 0.06 X10*3/UL (ref 0–0.1)
BASOPHILS NFR BLD AUTO: 0.6 %
BILIRUB SERPL-MCNC: 0.7 MG/DL (ref 0–1.2)
BILIRUB UR STRIP.AUTO-MCNC: NEGATIVE MG/DL
BUN SERPL-MCNC: 6 MG/DL (ref 6–23)
CALCIUM SERPL-MCNC: 9.8 MG/DL (ref 8.6–10.3)
CHLORIDE SERPL-SCNC: 103 MMOL/L (ref 98–107)
CO2 SERPL-SCNC: 26 MMOL/L (ref 21–32)
COLOR UR: COLORLESS
CREAT SERPL-MCNC: 0.64 MG/DL (ref 0.5–1.05)
EGFRCR SERPLBLD CKD-EPI 2021: >90 ML/MIN/1.73M*2
EOSINOPHIL # BLD AUTO: 0.3 X10*3/UL (ref 0–0.7)
EOSINOPHIL NFR BLD AUTO: 3.2 %
ERYTHROCYTE [DISTWIDTH] IN BLOOD BY AUTOMATED COUNT: 11.9 % (ref 11.5–14.5)
GLUCOSE SERPL-MCNC: 82 MG/DL (ref 74–99)
GLUCOSE UR STRIP.AUTO-MCNC: NORMAL MG/DL
HCT VFR BLD AUTO: 44.7 % (ref 36–46)
HGB BLD-MCNC: 15 G/DL (ref 12–16)
IMM GRANULOCYTES # BLD AUTO: 0.03 X10*3/UL (ref 0–0.7)
IMM GRANULOCYTES NFR BLD AUTO: 0.3 % (ref 0–0.9)
KETONES UR STRIP.AUTO-MCNC: NEGATIVE MG/DL
LEUKOCYTE ESTERASE UR QL STRIP.AUTO: NEGATIVE
LYMPHOCYTES # BLD AUTO: 1.83 X10*3/UL (ref 1.2–4.8)
LYMPHOCYTES NFR BLD AUTO: 19.8 %
MCH RBC QN AUTO: 28.2 PG (ref 26–34)
MCHC RBC AUTO-ENTMCNC: 33.6 G/DL (ref 32–36)
MCV RBC AUTO: 84 FL (ref 80–100)
MONOCYTES # BLD AUTO: 0.53 X10*3/UL (ref 0.1–1)
MONOCYTES NFR BLD AUTO: 5.7 %
NEUTROPHILS # BLD AUTO: 6.49 X10*3/UL (ref 1.2–7.7)
NEUTROPHILS NFR BLD AUTO: 70.4 %
NITRITE UR QL STRIP.AUTO: NEGATIVE
NRBC BLD-RTO: 0 /100 WBCS (ref 0–0)
PH UR STRIP.AUTO: 7.5 [PH]
PLATELET # BLD AUTO: 318 X10*3/UL (ref 150–450)
POTASSIUM SERPL-SCNC: 3.5 MMOL/L (ref 3.5–5.3)
PROT SERPL-MCNC: 8.4 G/DL (ref 6.4–8.2)
PROT UR STRIP.AUTO-MCNC: NEGATIVE MG/DL
RBC # BLD AUTO: 5.32 X10*6/UL (ref 4–5.2)
RBC # UR STRIP.AUTO: NEGATIVE /UL
SODIUM SERPL-SCNC: 140 MMOL/L (ref 136–145)
SP GR UR STRIP.AUTO: 1
UROBILINOGEN UR STRIP.AUTO-MCNC: NORMAL MG/DL
WBC # BLD AUTO: 9.2 X10*3/UL (ref 4.4–11.3)

## 2024-10-22 PROCEDURE — 74176 CT ABD & PELVIS W/O CONTRAST: CPT | Performed by: STUDENT IN AN ORGANIZED HEALTH CARE EDUCATION/TRAINING PROGRAM

## 2024-10-22 PROCEDURE — 2500000004 HC RX 250 GENERAL PHARMACY W/ HCPCS (ALT 636 FOR OP/ED)

## 2024-10-22 PROCEDURE — 74176 CT ABD & PELVIS W/O CONTRAST: CPT

## 2024-10-22 PROCEDURE — A9698 NON-RAD CONTRAST MATERIALNOC: HCPCS

## 2024-10-22 PROCEDURE — 36415 COLL VENOUS BLD VENIPUNCTURE: CPT

## 2024-10-22 PROCEDURE — 2550000001 HC RX 255 CONTRASTS

## 2024-10-22 PROCEDURE — 84702 CHORIONIC GONADOTROPIN TEST: CPT

## 2024-10-22 PROCEDURE — 99284 EMERGENCY DEPT VISIT MOD MDM: CPT

## 2024-10-22 PROCEDURE — 81003 URINALYSIS AUTO W/O SCOPE: CPT

## 2024-10-22 PROCEDURE — 2500000001 HC RX 250 WO HCPCS SELF ADMINISTERED DRUGS (ALT 637 FOR MEDICARE OP)

## 2024-10-22 PROCEDURE — 84075 ASSAY ALKALINE PHOSPHATASE: CPT

## 2024-10-22 PROCEDURE — 74018 RADEX ABDOMEN 1 VIEW: CPT | Performed by: RADIOLOGY

## 2024-10-22 PROCEDURE — 74018 RADEX ABDOMEN 1 VIEW: CPT

## 2024-10-22 PROCEDURE — 85025 COMPLETE CBC W/AUTO DIFF WBC: CPT

## 2024-10-22 RX ORDER — DICYCLOMINE HYDROCHLORIDE 10 MG/ML
10 INJECTION INTRAMUSCULAR ONCE
Status: DISCONTINUED | OUTPATIENT
Start: 2024-10-22 | End: 2024-10-22

## 2024-10-22 RX ORDER — BISACODYL 10 MG/1
10 SUPPOSITORY RECTAL ONCE
Status: COMPLETED | OUTPATIENT
Start: 2024-10-22 | End: 2024-10-22

## 2024-10-22 RX ORDER — LACTULOSE 10 G/15ML
20 SOLUTION ORAL ONCE
Status: COMPLETED | OUTPATIENT
Start: 2024-10-22 | End: 2024-10-22

## 2024-10-22 RX ORDER — DOCUSATE SODIUM 100 MG/1
100 CAPSULE, LIQUID FILLED ORAL ONCE
Status: COMPLETED | OUTPATIENT
Start: 2024-10-22 | End: 2024-10-22

## 2024-10-22 RX ORDER — POLYETHYLENE GLYCOL 3350 17 G/17G
17 POWDER, FOR SOLUTION ORAL ONCE
Status: COMPLETED | OUTPATIENT
Start: 2024-10-22 | End: 2024-10-22

## 2024-10-22 RX ORDER — DIATRIZOATE MEGLUMINE AND DIATRIZOATE SODIUM 660; 100 MG/ML; MG/ML
60 SOLUTION ORAL; RECTAL ONCE
Status: DISCONTINUED | OUTPATIENT
Start: 2024-10-22 | End: 2024-10-23 | Stop reason: HOSPADM

## 2024-10-22 ASSESSMENT — LIFESTYLE VARIABLES
TOTAL SCORE: 0
HAVE YOU EVER FELT YOU SHOULD CUT DOWN ON YOUR DRINKING: NO
EVER HAD A DRINK FIRST THING IN THE MORNING TO STEADY YOUR NERVES TO GET RID OF A HANGOVER: NO
HAVE PEOPLE ANNOYED YOU BY CRITICIZING YOUR DRINKING: NO
EVER FELT BAD OR GUILTY ABOUT YOUR DRINKING: NO

## 2024-10-22 ASSESSMENT — PAIN SCALES - GENERAL: PAINLEVEL_OUTOF10: 6

## 2024-10-22 ASSESSMENT — PAIN - FUNCTIONAL ASSESSMENT: PAIN_FUNCTIONAL_ASSESSMENT: 0-10

## 2024-10-22 ASSESSMENT — PAIN DESCRIPTION - LOCATION: LOCATION: ABDOMEN

## 2024-10-22 NOTE — ED PROVIDER NOTES
"Emergency Department Provider Note          History of Present Illness     CC: Constipation (No \"normal\" BM x1 week. Has drank Magnesium Citrate for the last two days with no relief. Endorses nausea )     History provided by: Patient  Limitations to History: None    HPI:   Elizabeth Toro is a 24 y.o.female with PMH IBS, MDD, autoimmune thyroiditis/gastritis/anemia presenting to the Emergency Department for constipation. The constipation began over a week ago, with the last full bowel movement around early last week and only small, incomplete bowel movements since the middle of last week. Despite consuming two cups of prune juice and a full bottle of magnesium citrate over the past two days--her usual remedies for constipation--she has experienced no relief. She reports experiencing some nausea, though no vomiting. The patient notes a recent change in lifestyle with less physical activity and increased food intake due to a new job, possibly contributing to her symptoms. She reports a history of alternating diarrhea and constipation due to IBS but has never experienced constipation this severe. No dysuria, no increased frequency.    Records Reviewed: Recent available ED and inpatient notes reviewed in EMR.    PMHx/PSHx:  Per HPI.   - has a past medical history of Anemia, Autoimmune gastritis, Goiter, Hashimoto's thyroiditis, Hypothyroidism, Thyroid antibody positive, Thyroid nodule, Vitamin B 12 deficiency, and Vitamin D deficiency.  - has no past surgical history on file.  - has Contusion of finger without damage to nail, initial encounter; RLQ abdominal tenderness; Pelvic pain in female; Iron deficiency anemia secondary to inadequate dietary iron intake; Malabsorption due to intolerance, not elsewhere classified; Autoimmune gastritis; Vaginal discharge; and Thyroid antibody positive on their problem list.    Medications:  Current Outpatient Medications   Medication Instructions    calcium polycarbophiL (FIBERCON) " 625 mg, oral, Daily, Take one pill once a day and go up by one or down by one as needed for constipation    cetirizine (ZYRTEC) 10 mg, oral, Daily    copper gluconate 2 mg, oral, Daily RT    famotidine (Pepcid) 20 mg tablet oral    fluticasone (Flonase) 50 mcg/actuation nasal spray 2 sprays, Each Nostril, Every 24 hours    hydrOXYzine HCL (ATARAX) 50 mg, oral, As needed    ibuprofen 600 mg, oral, Every 6 hours PRN    levothyroxine (Synthroid, Levoxyl) 50 mcg tablet -take 1 pill at bedtime    ondansetron (Zofran) 4 mg tablet 1 tablet, oral, Every 6 hours    pantoprazole (PROTONIX) 20 mg, oral, Daily before breakfast, Do not crush, chew, or split.    sertraline (ZOLOFT) 25 mg, oral, Daily    Sucraid 8,500 unit/mL solution Take 2mL by mouth with every meal or snack up to 6 times per day    traZODone (DESYREL) 50 mg, oral, Nightly    triamcinolone (Kenalog) 0.5 % ointment 2 times daily,  APPLY SPARINGLY TO AFFECTED AREA(S)     valACYclovir (VALTREX) 500 mg, oral, As needed        Allergies:  House dust mite, Lorazepam, Milk, and Morphine    Social History:  - Tobacco:  reports that she has never smoked. She has never been exposed to tobacco smoke. She has never used smokeless tobacco.   - Alcohol:  reports that she does not currently use alcohol after a past usage of about 2.0 standard drinks of alcohol per week.   - Illicit Drugs:  reports that she does not currently use drugs.     ROS:  Per HPI.       Physical Exam     Triage Vitals:  T 36.2 °C (97.2 °F)  HR 91  BP (!) 140/92  RR 18  O2 98 % None (Room air)    General: Awake, alert, in no acute distress  Eyes: Gaze conjugate.  No scleral icterus or injection  HENT: Normo-cephalic, atraumatic. No stridor  CV: Regular rate, regular rhythm. Radial pulses 2+ bilaterally  Resp: Breathing non-labored, speaking in full sentences.  Clear to auscultation bilaterally  GI: Soft, non-distended, non-tender. No rebound or guarding.  MSK/Extremities: No gross bony deformities.  Moving all extremities  Skin: Warm. Appropriate color  Neuro: Alert. Oriented. Face symmetric. Speech is fluent.  Gross strength and sensation intact in b/l UE and LEs  Psych: Appropriate mood and affect          [place] Coma Scale Score: 15                    Medical Decision Making & ED Course     EKG: EKG interpreted by myself. Please see ED Course for full interpretation.    Medical Decision Making   Elizabeth Toro is a 24 y.o.female presenting to the Emergency Department for constipation.  On arrival, vital signs within normal limits, afebrile for us.  On examination, patient appears otherwise clinical well.  Does have mild diffuse abdominal tenderness, worse in the bilateral lower quadrants.  No rebound or guarding, not peritoneal today.  Given patient's history of constipation, lack of bowel for the past week, believe this is the likely cause of her symptoms today.  Has been passing gas with lower concern for SBO.  However will get x-ray to assess for stool burden.  Will give her a soapsuds enema for symptomatic control.  Given her lower quadrant abdominal pain will also get basic labs including a CBC, CMP, urinalysis for definitive infection rule out.    Update: Labs notable for a white count of 9.2, hemoglobin stable at 15.2.  Chemistries relatively unremarkable.  Urinalysis negative for acute UTI.  hCG less than 2, not pregnant today. X-ray did show mild stool burden.  Given additional doses of Colace, lactulose, Dulcolax with mild improvement in patient's abdominal pain. CT abdomen pelvis was ordered with oral contrast that showed no acute intra-abdominal process.  On reassessment patient feels symptomatically improved. Otherwise HDS and tolerating PO. Will be discharged home in stable condition.    ED Course as of 10/23/24 1221   Tue Oct 22, 2024   1737 Provider called to bedside. Patient having a vasovagal event feeling lightheaded with hand numbness and chest tightness after pushing hard on the  toilet. Counseled to take slow deep breaths. Patient rapidly returned back to baseline. [AS]      ED Course User Index  [AS] Girish David MD         Diagnoses as of 10/23/24 1221   Constipation, unspecified constipation type   Generalized abdominal pain       Independent Result Review and Interpretation: Relevant laboratory and radiographic results were reviewed and independently interpreted by myself.  As necessary, they are commented on in the ED Course.    Chronic conditions affecting the patient's care: As documented above in MDM      Disposition   Discharge    Girish David MD  Emergency Medicine PGY3      Procedures     Procedures ? Smart[employer] last updated 10/23/2024 12:21 PM        Girish David MD  Resident  10/23/24 1221

## 2024-10-22 NOTE — ED TRIAGE NOTES
"Patient here for constipation  No \"normal\" BM x1 week. Has drank Magnesium Citrate for the last two days with no relief. Endorses nausea   "

## 2024-10-23 LAB — HOLD SPECIMEN: NORMAL

## 2024-10-23 RX ORDER — HEPARIN 100 UNIT/ML
500 SYRINGE INTRAVENOUS AS NEEDED
OUTPATIENT
Start: 2024-10-23

## 2024-10-23 RX ORDER — HEPARIN SODIUM,PORCINE/PF 10 UNIT/ML
50 SYRINGE (ML) INTRAVENOUS AS NEEDED
OUTPATIENT
Start: 2024-10-23

## 2024-10-23 NOTE — ED PROVIDER NOTES
Oncoming physician note from Dr. Paul Rogers    I assumed care of the patient on 10/22/24 at 1500    I reviewed the chart, labs and imaging. I talked to the off going physician. I personally saw the patient and made/approved the management plan and take responsibility for the patient management.     Worked up in the emergency room for abdominal pain and constipation.  Patient had a CT scan and labs and was given a number of medications with no significant success.  Not having significant discomfort at this time.  Patient is stable I talked her about the results of the imaging and labs.  We can with the plan including FiberCon and follow-up.  She has her own GI physician.  No indication for hospitalization.  Patient is stable.    ED Course as of 10/22/24 2300   Tue Oct 22, 2024   1737 Provider called to bedside. Patient having a vasovagal event feeling lightheaded with hand numbness and chest tightness after pushing hard on the toilet. Counseled to take slow deep breaths. Patient rapidly returned back to baseline. [AS]      ED Course User Index  [AS] Girish David MD         Diagnoses as of 10/22/24 2300   Constipation, unspecified constipation type   Generalized abdominal pain        Paul Rogers MD  10/22/24 2300

## 2024-10-24 ENCOUNTER — TELEPHONE (OUTPATIENT)
Dept: ENDOCRINOLOGY | Facility: CLINIC | Age: 24
End: 2024-10-24
Payer: COMMERCIAL

## 2024-10-24 NOTE — TELEPHONE ENCOUNTER
Pt called in because she went to emergency had to have a CT with the contrast wants to know if there is anything she needs to watch out for because she hasn't been feeling well since then and knows the contrast has an effect on pt with thyroid     Please advise

## 2024-10-28 ENCOUNTER — LAB (OUTPATIENT)
Dept: LAB | Facility: LAB | Age: 24
End: 2024-10-28
Payer: COMMERCIAL

## 2024-10-28 ENCOUNTER — TELEPHONE (OUTPATIENT)
Dept: ENDOCRINOLOGY | Facility: CLINIC | Age: 24
End: 2024-10-28

## 2024-10-28 DIAGNOSIS — K90.49 MALABSORPTION DUE TO INTOLERANCE, NOT ELSEWHERE CLASSIFIED: ICD-10-CM

## 2024-10-28 DIAGNOSIS — E16.1 REACTIVE HYPOGLYCEMIA: ICD-10-CM

## 2024-10-28 DIAGNOSIS — D50.8 IRON DEFICIENCY ANEMIA SECONDARY TO INADEQUATE DIETARY IRON INTAKE: ICD-10-CM

## 2024-10-28 DIAGNOSIS — E06.3 HASHIMOTO'S THYROIDITIS: ICD-10-CM

## 2024-10-28 DIAGNOSIS — E55.9 VITAMIN D3 DEFICIENCY: ICD-10-CM

## 2024-10-28 DIAGNOSIS — R76.8 THYROID ANTIBODY POSITIVE: ICD-10-CM

## 2024-10-28 DIAGNOSIS — E16.1 REACTIVE HYPOGLYCEMIA: Primary | ICD-10-CM

## 2024-10-28 DIAGNOSIS — E06.3 HASHIMOTO'S THYROIDITIS: Primary | ICD-10-CM

## 2024-10-28 DIAGNOSIS — K29.40 AUTOIMMUNE GASTRITIS: ICD-10-CM

## 2024-10-28 LAB
ALBUMIN SERPL BCP-MCNC: 5.1 G/DL (ref 3.4–5)
ALP SERPL-CCNC: 58 U/L (ref 33–110)
ALT SERPL W P-5'-P-CCNC: 7 U/L (ref 7–45)
ANION GAP SERPL CALC-SCNC: 11 MMOL/L (ref 10–20)
AST SERPL W P-5'-P-CCNC: 14 U/L (ref 9–39)
BASOPHILS # BLD AUTO: 0.08 X10*3/UL (ref 0–0.1)
BASOPHILS NFR BLD AUTO: 0.7 %
BILIRUB SERPL-MCNC: 0.5 MG/DL (ref 0–1.2)
BUN SERPL-MCNC: 6 MG/DL (ref 6–23)
CALCIUM SERPL-MCNC: 9.9 MG/DL (ref 8.6–10.3)
CHLORIDE SERPL-SCNC: 105 MMOL/L (ref 98–107)
CO2 SERPL-SCNC: 29 MMOL/L (ref 21–32)
CREAT SERPL-MCNC: 0.67 MG/DL (ref 0.5–1.05)
EGFRCR SERPLBLD CKD-EPI 2021: >90 ML/MIN/1.73M*2
EOSINOPHIL # BLD AUTO: 0.62 X10*3/UL (ref 0–0.7)
EOSINOPHIL NFR BLD AUTO: 5.6 %
ERYTHROCYTE [DISTWIDTH] IN BLOOD BY AUTOMATED COUNT: 12.1 % (ref 11.5–14.5)
FERRITIN SERPL-MCNC: 115 NG/ML (ref 8–150)
GLUCOSE SERPL-MCNC: 71 MG/DL (ref 74–99)
HCT VFR BLD AUTO: 42.5 % (ref 36–46)
HGB BLD-MCNC: 14 G/DL (ref 12–16)
IMM GRANULOCYTES # BLD AUTO: 0.04 X10*3/UL (ref 0–0.7)
IMM GRANULOCYTES NFR BLD AUTO: 0.4 % (ref 0–0.9)
IRON SATN MFR SERPL: 24 % (ref 25–45)
IRON SERPL-MCNC: 74 UG/DL (ref 35–150)
LYMPHOCYTES # BLD AUTO: 1.91 X10*3/UL (ref 1.2–4.8)
LYMPHOCYTES NFR BLD AUTO: 17.1 %
MCH RBC QN AUTO: 28 PG (ref 26–34)
MCHC RBC AUTO-ENTMCNC: 32.9 G/DL (ref 32–36)
MCV RBC AUTO: 85 FL (ref 80–100)
MONOCYTES # BLD AUTO: 0.49 X10*3/UL (ref 0.1–1)
MONOCYTES NFR BLD AUTO: 4.4 %
NEUTROPHILS # BLD AUTO: 8.03 X10*3/UL (ref 1.2–7.7)
NEUTROPHILS NFR BLD AUTO: 71.8 %
NRBC BLD-RTO: 0 /100 WBCS (ref 0–0)
PLATELET # BLD AUTO: 310 X10*3/UL (ref 150–450)
POTASSIUM SERPL-SCNC: 3.5 MMOL/L (ref 3.5–5.3)
PROT SERPL-MCNC: 7.9 G/DL (ref 6.4–8.2)
RBC # BLD AUTO: 5 X10*6/UL (ref 4–5.2)
SODIUM SERPL-SCNC: 141 MMOL/L (ref 136–145)
T4 FREE SERPL-MCNC: 0.35 NG/DL (ref 0.61–1.12)
TIBC SERPL-MCNC: 314 UG/DL (ref 240–445)
TSH SERPL-ACNC: 41.62 MIU/L (ref 0.44–3.98)
UIBC SERPL-MCNC: 240 UG/DL (ref 110–370)
WBC # BLD AUTO: 11.2 X10*3/UL (ref 4.4–11.3)

## 2024-10-28 PROCEDURE — 85025 COMPLETE CBC W/AUTO DIFF WBC: CPT

## 2024-10-28 PROCEDURE — 84439 ASSAY OF FREE THYROXINE: CPT

## 2024-10-28 PROCEDURE — 82746 ASSAY OF FOLIC ACID SERUM: CPT

## 2024-10-28 PROCEDURE — 36415 COLL VENOUS BLD VENIPUNCTURE: CPT

## 2024-10-28 PROCEDURE — 80053 COMPREHEN METABOLIC PANEL: CPT

## 2024-10-28 PROCEDURE — 84443 ASSAY THYROID STIM HORMONE: CPT

## 2024-10-28 PROCEDURE — 82728 ASSAY OF FERRITIN: CPT

## 2024-10-28 PROCEDURE — 83540 ASSAY OF IRON: CPT

## 2024-10-28 PROCEDURE — 83550 IRON BINDING TEST: CPT

## 2024-10-28 PROCEDURE — 84481 FREE ASSAY (FT-3): CPT

## 2024-10-29 ENCOUNTER — PATIENT MESSAGE (OUTPATIENT)
Dept: ENDOCRINOLOGY | Facility: CLINIC | Age: 24
End: 2024-10-29
Payer: COMMERCIAL

## 2024-10-29 DIAGNOSIS — E06.3 HASHIMOTO'S THYROIDITIS: Primary | ICD-10-CM

## 2024-10-29 LAB
FOLATE SERPL-MCNC: 10.8 NG/ML
T3FREE SERPL-MCNC: 1.9 PG/ML (ref 2.3–4.2)

## 2024-10-29 RX ORDER — LEVOTHYROXINE SODIUM 100 UG/1
100 TABLET ORAL DAILY
Qty: 30 TABLET | Refills: 5 | Status: SHIPPED | OUTPATIENT
Start: 2024-10-29 | End: 2025-10-29

## 2024-11-05 ENCOUNTER — APPOINTMENT (OUTPATIENT)
Dept: ENDOCRINOLOGY | Facility: CLINIC | Age: 24
End: 2024-11-05
Payer: COMMERCIAL

## 2024-11-05 ENCOUNTER — TELEPHONE (OUTPATIENT)
Dept: ENDOCRINOLOGY | Facility: CLINIC | Age: 24
End: 2024-11-05

## 2024-11-06 ENCOUNTER — TELEPHONE (OUTPATIENT)
Dept: ENDOCRINOLOGY | Facility: CLINIC | Age: 24
End: 2024-11-06
Payer: COMMERCIAL

## 2024-11-06 NOTE — TELEPHONE ENCOUNTER
Elizabeth Toro   [id number]   [id number]   [phone]       Called patient and left voice message in regards to patient coming to appt at 11:15am for 11:30am appt.

## 2024-11-07 NOTE — PROGRESS NOTES
HPI   23 yo with subclinical hashimoto's disease, autoimmune gastritis, b12 def (on b12 injections/iron infusions).  had seen [facility]/Dr. Sexton in the past year     Initial visit:  Pt with tsh-3.84, normal t4/t3, +tpo earlier this year, see labs below from 2024.  Thyroid ultrasound 4/2024: R 8mm TR4 nodule (pt's mother with hx of thyroid cancer)  Pt with fatigue/brain fog/wt regain.    Also with question of reactive hypoglycemia middle of the day.  -suggested pt could start a trial of levothyroxine 50 mcg and repeat labs and follow up  -she did not do this    Since last visit:  -pt had IV contrast for abdominal CT oct 2024  -pt messaged after having this with fatigue/brain fog/confusion/weakness  -had oct 2024 labs with tsh-41, we sent pt rx for 100 mcg levothyroxine  -pt recalls having similar sx in spring 2023 after IV contrast but no labs were drawn at this time  -taking levothyroxine 100 mcg daily currently    -pt has questions about female hormones-she has normal periods, no hirsutism, no acne  -pt has questions about adrenal insufficiency-no signs of excessive cortisol, can screen for adrenal insufficiency  -questions of other thyroid formulations-would consider if synthetic isn't working      Current Outpatient Medications:     calcium polycarbophiL (Fibercon) 625 mg tablet, Take 1 tablet (625 mg) by mouth once daily. Take one pill once a day and go up by one or down by one as needed for constipation, Disp: 60 tablet, Rfl: 0    cetirizine (ZyrTEC) 10 mg tablet, Take 1 tablet (10 mg) by mouth once daily., Disp: , Rfl:     copper gluconate 2 mg tablet, Take 2 mg by mouth once daily., Disp: , Rfl:     famotidine (Pepcid) 20 mg tablet, Take by mouth., Disp: , Rfl:     fluticasone (Flonase) 50 mcg/actuation nasal spray, Administer 2 sprays into each nostril once every 24 hours., Disp: , Rfl:     hydrOXYzine HCL (Atarax) 50 mg tablet, Take 1 tablet (50 mg) by mouth if needed., Disp: , Rfl:     ibuprofen 200 mg  tablet, Take 3 tablets (600 mg) by mouth every 6 hours if needed., Disp: , Rfl:     levothyroxine (Synthroid, Levoxyl) 100 mcg tablet, Take 1 tablet (100 mcg) by mouth early in the morning.. Take on an empty stomach at the same time each day, either 30 to 60 minutes prior to breakfast, Disp: 30 tablet, Rfl: 5    levothyroxine (Synthroid, Levoxyl) 50 mcg tablet, -take 1 pill at bedtime, Disp: 30 tablet, Rfl: 5    ondansetron (Zofran) 4 mg tablet, Take 1 tablet (4 mg) by mouth every 6 hours., Disp: , Rfl:     pantoprazole (ProtoNix) 20 mg EC tablet, Take 1 tablet (20 mg) by mouth once daily in the morning. Take before meals. Do not crush, chew, or split., Disp: , Rfl:     sertraline (Zoloft) 25 mg tablet, Take 1 tablet (25 mg) by mouth once daily., Disp: , Rfl:     Sucraid 8,500 unit/mL solution, Take 2mL by mouth with every meal or snack up to 6 times per day, Disp: , Rfl:     traZODone (Desyrel) 50 mg tablet, Take 1 tablet (50 mg) by mouth once daily at bedtime., Disp: , Rfl:     triamcinolone (Kenalog) 0.5 % ointment, twice a day.  APPLY SPARINGLY TO AFFECTED AREA(S), Disp: , Rfl:     valACYclovir (Valtrex) 500 mg tablet, Take 1 tablet (500 mg) by mouth if needed., Disp: , Rfl:       Allergies as of 11/08/2024 - Reviewed 11/08/2024   Allergen Reaction Noted    House dust mite Other 02/24/2020    Iodine Other 11/08/2024    Lorazepam Unknown 11/24/2020    Milk GI Upset 10/06/2023    Morphine Unknown 10/06/2023         Review of Systems   Cardiology: Lightheadedness-denies.  Chest pain-denies.  Leg edema-denies.  Palpitations-denies.  Respiratory: Cough-denies. Shortness of breath-denies.  Wheezing-denies.  Gastroenterology: Constipation-denies.  Diarrhea-denies.  Heartburn-denies.  Endocrinology: Cold intolerance-denies.  Heat intolerance-denies.  Sweats-denies.  Neurology: Headache-denies.  Tremor-denies.  Neuropathy in extremities-denies.  Psychology: Low energy +  Irritability-denies.  Sleep  disturbances-denies.      /80 (BP Location: Left arm, Patient Position: Sitting, BP Cuff Size: Adult)   Pulse 77   Wt 80.5 kg (177 lb 6.4 oz)   BMI 28.63 kg/m²       Labs:  Lab Results   Component Value Date    WBC 11.2 10/28/2024    NRBC 0.0 10/28/2024    RBC 5.00 10/28/2024    HGB 14.0 10/28/2024    HCT 42.5 10/28/2024     10/28/2024     Lab Results   Component Value Date    CALCIUM 9.9 10/28/2024    AST 14 10/28/2024    ALKPHOS 58 10/28/2024    BILITOT 0.5 10/28/2024    PROT 7.9 10/28/2024    ALBUMIN 5.1 (H) 10/28/2024     10/28/2024    K 3.5 10/28/2024     10/28/2024    CO2 29 10/28/2024    ANIONGAP 11 10/28/2024    BUN 6 10/28/2024    CREATININE 0.67 10/28/2024    GLUCOSE 71 (L) 10/28/2024    ALT 7 10/28/2024    EGFR >90 10/28/2024       Lab Results   Component Value Date    TSH 41.62 (H) 10/28/2024     Lab Results   Component Value Date    [id number] >2,000 (H) 04/29/2024     Lab Results   Component Value Date    HGBA1C 4.6 09/17/2024         Physical Exam   General Appearance: pleasant, cooperative, no acute distress  HEENT: no chemosis, no proptosis, no lid lag, no lid retraction  Neck: no lymphadenopathy, no thyromegaly, no dominant thyroid nodules  Heart: no murmurs, regular rate and rhythm, S1 and S2  Lungs: no wheezes, no rhonci, no rales  Extremities: no lower extremity swelling      Assessment/Plan   1. Hashimoto's thyroiditis (Primary)  -continue levothyroxine 100 mcg, take at bedtime  -repeat labs in 6 weeks, titrate dose accordingly    2. Thyroid nodule  -repeat thyroid ultrasound in spring 2025, orders entered    3. Screen for Adrenal insuff  -check acth/cortisol/dhea-s         Follow Up:  Venu 4 months    -labs/tests/notes reviewed  -reviewed and counseled patient on medication monitoring and side effects

## 2024-11-08 ENCOUNTER — TELEPHONE (OUTPATIENT)
Dept: HEMATOLOGY/ONCOLOGY | Facility: HOSPITAL | Age: 24
End: 2024-11-08

## 2024-11-08 ENCOUNTER — OFFICE VISIT (OUTPATIENT)
Dept: ENDOCRINOLOGY | Facility: CLINIC | Age: 24
End: 2024-11-08
Payer: COMMERCIAL

## 2024-11-08 ENCOUNTER — OFFICE VISIT (OUTPATIENT)
Dept: HEMATOLOGY/ONCOLOGY | Facility: HOSPITAL | Age: 24
End: 2024-11-08
Payer: COMMERCIAL

## 2024-11-08 VITALS
DIASTOLIC BLOOD PRESSURE: 82 MMHG | BODY MASS INDEX: 28.92 KG/M2 | SYSTOLIC BLOOD PRESSURE: 133 MMHG | TEMPERATURE: 98.6 F | WEIGHT: 179.2 LBS | HEART RATE: 84 BPM | RESPIRATION RATE: 18 BRPM | OXYGEN SATURATION: 97 %

## 2024-11-08 VITALS
BODY MASS INDEX: 28.63 KG/M2 | HEART RATE: 77 BPM | WEIGHT: 177.4 LBS | DIASTOLIC BLOOD PRESSURE: 80 MMHG | SYSTOLIC BLOOD PRESSURE: 103 MMHG

## 2024-11-08 DIAGNOSIS — K90.49 MALABSORPTION DUE TO INTOLERANCE, NOT ELSEWHERE CLASSIFIED: ICD-10-CM

## 2024-11-08 DIAGNOSIS — K29.40 AUTOIMMUNE GASTRITIS: ICD-10-CM

## 2024-11-08 DIAGNOSIS — G89.29 CHRONIC NONINTRACTABLE HEADACHE, UNSPECIFIED HEADACHE TYPE: Primary | ICD-10-CM

## 2024-11-08 DIAGNOSIS — E06.3 HASHIMOTO'S THYROIDITIS: Primary | ICD-10-CM

## 2024-11-08 DIAGNOSIS — E16.1 REACTIVE HYPOGLYCEMIA: ICD-10-CM

## 2024-11-08 DIAGNOSIS — E04.1 THYROID NODULE: ICD-10-CM

## 2024-11-08 DIAGNOSIS — D50.8 IRON DEFICIENCY ANEMIA SECONDARY TO INADEQUATE DIETARY IRON INTAKE: ICD-10-CM

## 2024-11-08 DIAGNOSIS — R53.83 OTHER FATIGUE: ICD-10-CM

## 2024-11-08 DIAGNOSIS — R51.9 CHRONIC NONINTRACTABLE HEADACHE, UNSPECIFIED HEADACHE TYPE: Primary | ICD-10-CM

## 2024-11-08 DIAGNOSIS — H53.8 BLURRY VISION, BILATERAL: ICD-10-CM

## 2024-11-08 PROCEDURE — 99214 OFFICE O/P EST MOD 30 MIN: CPT | Performed by: INTERNAL MEDICINE

## 2024-11-08 PROCEDURE — 1036F TOBACCO NON-USER: CPT | Performed by: INTERNAL MEDICINE

## 2024-11-08 PROCEDURE — 1036F TOBACCO NON-USER: CPT

## 2024-11-08 PROCEDURE — 99214 OFFICE O/P EST MOD 30 MIN: CPT

## 2024-11-08 ASSESSMENT — ENCOUNTER SYMPTOMS: DEPRESSION: 0

## 2024-11-08 ASSESSMENT — PAIN SCALES - GENERAL
PAINLEVEL_OUTOF10: 4
PAINLEVEL_OUTOF10: 2

## 2024-11-08 NOTE — PROGRESS NOTES
Patient Visit Information:   Visit Type: Follow up Visit         PCP: Dr. Sid Staley, at [facility]    History of Present Illness:     Hematology History:  Patient has had a low ferritin since 2018, tried oral iron but had GI intolerance, 2019 EGD and colonoscopy with a polyp removed otherwise no findings and had a second colonoscopy shortly after that because of bleeding status post staples, no red meat since 2020 d/t intolerance, no pica, no diagnosis of celiac disease, no use of a PPI.  She has had in the toilet after bowel movements for the past several weeks, she relates to her hemorrhoids, new occurrence following gastroenterologist December 2023.  GI has treated her previously for this issue. Treatment for H. pylori March 2023 by nurse practitioner Bubba Contreras but had intolerance to the medication, also has strep throat treated March 2023. She has finished her H. pylori treatments. Consult new GI and she was advised to discontinue PPI per her report.  ER visit in April 2023 regarding anxiety, CT head no acute abnormality.   Patient has anxiety and depression, followed by psychiatry, mood doing okay right now although her medications may be adjusted due to dizziness.  She is on Zoloft and follows psychiatry. On trazodone and PPI. Feels multiple factors including.   Hemorrhoids. Sugar issues-hypoglycemia. No bruise or bleeding.   Last period 4/24/2024.   She has heavy menses changing her feminine product every 2-3 hours, follows with gynecology who is considering endometriosis with further evaluation pending, took OCPs in the past without benefit. Her last menstrual started 8 days ago and lasted 5 days. Chronic fluctuating diarrhea and constipation diagnosed with irritable bowel syndrome/disease, intermittent abdominal pain.  She has chronic sweats for years.  She has chronic headaches which are ongoing and being evaluated  by neurology with CT brain and neck 04/2023 normal. Saw a private neurologist did MRI was  "normal per her report.  She has seen a cardiologist because of some intermittent chest pain and palpitations. She wore a heart monitor and had a echo and stress test all negative. Reports \"skipping beat still.\" Follow PRN. She follows with an allergist/immunologist for history of multiple allergies. She saw vascular surgery for her Raynaud's ml, post covid, vascular response. Follows Hematology at [facility], labs in July 2023. Receives B12 injections weekly through them.     ID Statement:    RUFINA EDWARDS is a 24 year old Female     Chief Complaint: Follow-up regarding ANTOINE and several vitamin Deficiencies    Interval History:    Patient is a 24-year-old  female who presents for follow-up visit.  Family history of thalassemia and personal history of low ferritin, copper, vitamin b12 and folate.     Recently ill with a cold. Brain fog, anxiety, fatigue, confusion, vision problems, weakness. Headaches. Scheduled Neuro. she would like her B12 and B6 assessed.  Recently she just followed endocrinology today being treated for hypothyroidism on levothyroxine.  States allergy to iodine following CT.   She states it was a lot with her autoimmune disease.      Denies any fevers, lymphadenopathy, recent or recurrent infections, edema, cough, dysuria or hematuria, pain or neurologic symptoms except as above, rashes or lumps, abnormal bleeding except as above, diabetes or thyroid disorder.  Appetite stable and weight stable, losing since starting levothyroxine. Drinks plenty of fluid.   She is following infusion center in [place] getting B12 injections and following functional medicine.       Past medical history  -Anemia  -April 2023 hematuria-Per patient no menses at the time  -Depression and anxiety  -H. pylori in March 2023   -Ruptured ovarian cyst seen on CT 3/2023, also hospitalized for this 11/2015. Gynecology discussed laparoscopy 2/2020 to evaluate for endometriosis.  -Asthma  as a child  -Seasonal allergies  -Hypokalemia " March 2023, 2019, 2015  -March/April 2023 strep throat   -Headaches   -Genital HSV  -Palpitations   -COVID 10/2021, 12/2021, 8/2022    Past surgical history  [place] teeth removal,  EGD, colonoscopy     Social history:  Patient lives with her mother, stepfather, sister, and boyfriend.   She denies ever smoking, no alcohol or drug abuse.  Working weekly [employer]    Family history:  Mom, living thalassemia -chronic anemia, hysterectomy due to heavy bleeding  No other known hematology or cancer diagnosis is in her family.       Review of Systems:   System Review-All other systems including Neurologic, Cardiac, Gastrointestinal, Endocrine, Dermatologic, ENT, Respiratory, Infectious, Urologic, Musculoskeletal  have been reviewed and are negative for complaint outside of events noted below and within the assessment.      Allergies and Intolerances:       Allergies:               Allergies   Allergen Reactions    Lorazepam Unknown    Milk Unknown    Morphine Unknown         Outpatient Medication Profile:       Current Outpatient Medications   Medication Instructions    famotidine (Pepcid) 20 mg tablet oral    norethindrone (Aygestin) 5 mg tablet 1 tablet, oral, Daily    ondansetron (Zofran) 4 mg tablet 1 tablet, oral, Every 6 hours    triamcinolone (Kenalog) 0.5 % ointment 2 times daily,     APPLY SPARINGLY TO AFFECTED AREA(S)                Medical History:         Intestinal malabsorption: ICD-10: K90.9, Status: Active         Iron deficiency: ICD-10: E61.1, Status: Active         Colon polyp: ICD-10: K63.5, Status: Active         Hematuria: ICD-10: R31.9, Status: Active         Weight loss: ICD-10: R63.4, Status: Active         Fatigue: ICD-10: R53.83, Status: Active         Elevated blood protein: ICD-10: R77.9, Status: Active         Leukocytosis: ICD-10: D72.829, Status: Active         Family history of thalassemia: ICD-10: Z83.2, Status: Active         Shortness of breath at rest: ICD-10: R06.02, Status:  "Active         Lactose intolerance: ICD-10: E73.9, Status: Active         Major depressive disorder: ICD-10: F32.9, Status: Active        Performance:   ECOG Performance Status: 0 Fully Active     Physical Exam:  Physical Exam  Constitutional:       Appearance: Normal appearance.   HENT:      Head: Normocephalic.      Right Ear: External ear normal.      Left Ear: External ear normal.      Nose: Nose normal.      Mouth/Throat:      Mouth: Mucous membranes are moist.      Pharynx: Oropharynx is clear.   Eyes:      Conjunctiva/sclera: Conjunctivae normal.   Cardiovascular:      Rate and Rhythm: Normal rate and regular rhythm.      Pulses: Normal pulses.      Heart sounds: Normal heart sounds.   Pulmonary:      Effort: Pulmonary effort is normal.      Breath sounds: Normal breath sounds.   Abdominal:      General: Abdomen is flat.      Palpations: Abdomen is soft.   Musculoskeletal:         General: Normal range of motion.      Cervical back: Normal range of motion and neck supple.   Skin:     General: Skin is warm and dry.      Capillary Refill: Capillary refill takes less than 2 seconds.   Neurological:      General: No focal deficit present.      Mental Status: She is alert and oriented to person, place, and time.   Psychiatric:         Mood and Affect: Mood normal.         Behavior: Behavior normal.         Thought Content: Thought content normal.         Judgment: Judgment normal.          8/2/2024     3:00 PM 8/26/2024    12:25 PM 8/26/2024    12:55 PM 10/22/2024     1:21 PM 10/22/2024    11:11 PM 11/8/2024    11:25 AM 11/8/2024     3:23 PM   Vitals   Systolic 115 137 130 140 133 103 133   Diastolic 78 60 62 92 84 80 82   Heart Rate 74 68 64 91 84 77 84   Temp 36.2 °C (97.1 °F) 36.8 °C (98.2 °F) 36.5 °C (97.7 °F) 36.2 °C (97.2 °F)   37 °C (98.6 °F)   Resp 18 16 16 18   18   Height (in)    1.676 m (5' 6\")      Weight (lb) 181.9   180  177.4 179.2   BMI 29.36 kg/m2   29.05 kg/m2  28.63 kg/m2 28.92 kg/m2   BSA (m2) " 1.96 m2   1.95 m2  1.94 m2 1.95 m2   Visit Report Report     Report    Report Report    Report      Lab Results:    Lab Results   Component Value Date    WBC 11.2 10/28/2024    NEUTROABS 8.03 (H) 10/28/2024    IGABSOL 0.04 10/28/2024    LYMPHSABS 1.91 10/28/2024    MONOSABS 0.49 10/28/2024    EOSABS 0.62 10/28/2024    BASOSABS 0.08 10/28/2024    RBC 5.00 10/28/2024    MCV 85 10/28/2024    MCHC 32.9 10/28/2024    HGB 14.0 10/28/2024    HCT 42.5 10/28/2024     10/28/2024     Lab Results   Component Value Date    RETICCTPCT 2.0 09/25/2023      Lab Results   Component Value Date    CREATININE 0.67 10/28/2024    BUN 6 10/28/2024    EGFR >90 10/28/2024     10/28/2024    K 3.5 10/28/2024     10/28/2024    CO2 29 10/28/2024      Lab Results   Component Value Date    ALT 7 10/28/2024    AST 14 10/28/2024    ALKPHOS 58 10/28/2024    BILITOT 0.5 10/28/2024      Lab Results   Component Value Date    TSH 41.62 (H) 10/28/2024     Lab Results   Component Value Date    TSH 41.62 (H) 10/28/2024    THYROIDPAB 657 (H) 02/09/2024     Lab Results   Component Value Date    IRON 74 10/28/2024    TIBC 314 10/28/2024    FERRITIN 115 10/28/2024      Lab Results   Component Value Date    TQVYGKSZ78 >2,000 (H) 04/29/2024      Lab Results   Component Value Date    FOLATE 10.8 10/28/2024     Lab Results   Component Value Date    SPEP NORMAL 06/15/2023     Radiology Result:  Impression:  No findings of an acute cardiopulmonary process.   Xray Chest 1 View [Jun 11 2023  9:54PM]  Impression:  No CT evidence of acute intracranialpathology.   CT Head without Contrast [Apr 2 2023  5:56PM]  Impression:  1. Ruptured right ovarian follicle.  2. Small to moderate amount of free fluid in the cul-de-sac.  3. Normal appendix.  4. No CT evidence of acute pancreatitis.   CT Abdomen and Pelvis with IV Contrast [Mar 24 2023  4:49PM]     Assessment and Plan:   Patient presents today for follow up due to symptoms over the last week. Reports  heavy mensis the chronic fatigue.  Additional new diagnosis of autoimmune gastritis. Following GI.  Received last B12 injection a month ago.  Encouraged her to continue healthy diet and hydration.  Activity as tolerated.  Hx iron infusions intermittently.  Most recent infusion August 2024.  She requested other vitamin levels assessed due to chronic fatigue and hx.   She completes all her screenings.   Diagnosed her with hypothyroidism from autoimmune Hashimoto's.  Follows endocrinology.  Advise she will follow-up physician here Dr. Mcmahon as she is in agreement.  Follow-up:     RTC:  --3 months with Dr. Mcmahon      Medication:  -IV Venofer x1 dose as needed  -B12 injections a month ago --follows functional medicine        Referral/other apts:  -Functional Medicine 11/18/2024  -Opth 12/18/2024  -Endo 12/30/2024  -Neuro 1/2/2025         Discussed labs in detail, answered questions addressed concerns, she agrees with plan of care.  We will discuss further plan of care at following appointment.  She will call with any symptoms.       Thank you for allowing me to care for you today.     Sincerely,  Acacia Hutton, APRN-CNP       This note was written with voice recognition software if there is need for clarity please reach out.

## 2024-11-08 NOTE — TELEPHONE ENCOUNTER
Attempted to reach patient to get her scheduled to see Acacia Hutton. Unable to leave message for patient at this time. Will send patient message.

## 2024-11-08 NOTE — TELEPHONE ENCOUNTER
Patient called back in regards to her needing to see Acacia Hutton. Patient requested to come in today. Scheduled patient for today 11/8/2024 @ 3:00 pm. Patient verbalized and agreed to appointment.

## 2024-11-11 ENCOUNTER — LAB (OUTPATIENT)
Dept: LAB | Facility: LAB | Age: 24
End: 2024-11-11
Payer: COMMERCIAL

## 2024-11-11 DIAGNOSIS — D50.8 IRON DEFICIENCY ANEMIA SECONDARY TO INADEQUATE DIETARY IRON INTAKE: ICD-10-CM

## 2024-11-11 DIAGNOSIS — K29.40 AUTOIMMUNE GASTRITIS: ICD-10-CM

## 2024-11-11 DIAGNOSIS — K90.49 MALABSORPTION DUE TO INTOLERANCE, NOT ELSEWHERE CLASSIFIED: ICD-10-CM

## 2024-11-11 DIAGNOSIS — E06.3 HASHIMOTO'S THYROIDITIS: ICD-10-CM

## 2024-11-11 DIAGNOSIS — R53.83 OTHER FATIGUE: ICD-10-CM

## 2024-11-11 LAB
ALBUMIN SERPL BCP-MCNC: 4.9 G/DL (ref 3.4–5)
ALP SERPL-CCNC: 67 U/L (ref 33–110)
ALT SERPL W P-5'-P-CCNC: 8 U/L (ref 7–45)
ANION GAP SERPL CALC-SCNC: 12 MMOL/L (ref 10–20)
AST SERPL W P-5'-P-CCNC: 13 U/L (ref 9–39)
BASOPHILS # BLD AUTO: 0.1 X10*3/UL (ref 0–0.1)
BASOPHILS NFR BLD AUTO: 0.8 %
BILIRUB SERPL-MCNC: 0.4 MG/DL (ref 0–1.2)
BUN SERPL-MCNC: 14 MG/DL (ref 6–23)
CALCIUM SERPL-MCNC: 9.7 MG/DL (ref 8.6–10.3)
CHLORIDE SERPL-SCNC: 102 MMOL/L (ref 98–107)
CO2 SERPL-SCNC: 30 MMOL/L (ref 21–32)
CORTIS SERPL-MCNC: 25.3 UG/DL (ref 2.5–20)
CREAT SERPL-MCNC: 0.58 MG/DL (ref 0.5–1.05)
DHEA-S SERPL-MCNC: 126 UG/DL (ref 65–395)
EGFRCR SERPLBLD CKD-EPI 2021: >90 ML/MIN/1.73M*2
EOSINOPHIL # BLD AUTO: 0.47 X10*3/UL (ref 0–0.7)
EOSINOPHIL NFR BLD AUTO: 3.8 %
ERYTHROCYTE [DISTWIDTH] IN BLOOD BY AUTOMATED COUNT: 12.3 % (ref 11.5–14.5)
FERRITIN SERPL-MCNC: 71 NG/ML (ref 8–150)
FOLATE SERPL-MCNC: 7.3 NG/ML
GLUCOSE SERPL-MCNC: 95 MG/DL (ref 74–99)
HCT VFR BLD AUTO: 40.7 % (ref 36–46)
HGB BLD-MCNC: 13.6 G/DL (ref 12–16)
IMM GRANULOCYTES # BLD AUTO: 0.06 X10*3/UL (ref 0–0.7)
IMM GRANULOCYTES NFR BLD AUTO: 0.5 % (ref 0–0.9)
IRON SATN MFR SERPL: 20 % (ref 25–45)
IRON SERPL-MCNC: 66 UG/DL (ref 35–150)
LYMPHOCYTES # BLD AUTO: 3 X10*3/UL (ref 1.2–4.8)
LYMPHOCYTES NFR BLD AUTO: 24 %
MCH RBC QN AUTO: 28.3 PG (ref 26–34)
MCHC RBC AUTO-ENTMCNC: 33.4 G/DL (ref 32–36)
MCV RBC AUTO: 85 FL (ref 80–100)
MONOCYTES # BLD AUTO: 0.71 X10*3/UL (ref 0.1–1)
MONOCYTES NFR BLD AUTO: 5.7 %
NEUTROPHILS # BLD AUTO: 8.14 X10*3/UL (ref 1.2–7.7)
NEUTROPHILS NFR BLD AUTO: 65.2 %
NRBC BLD-RTO: 0 /100 WBCS (ref 0–0)
PLATELET # BLD AUTO: 325 X10*3/UL (ref 150–450)
POTASSIUM SERPL-SCNC: 3.8 MMOL/L (ref 3.5–5.3)
PROT SERPL-MCNC: 7.5 G/DL (ref 6.4–8.2)
RBC # BLD AUTO: 4.81 X10*6/UL (ref 4–5.2)
SODIUM SERPL-SCNC: 140 MMOL/L (ref 136–145)
T4 FREE SERPL-MCNC: 0.84 NG/DL (ref 0.61–1.12)
TIBC SERPL-MCNC: 324 UG/DL (ref 240–445)
TSH SERPL-ACNC: 5.18 MIU/L (ref 0.44–3.98)
UIBC SERPL-MCNC: 258 UG/DL (ref 110–370)
VIT B12 SERPL-MCNC: >2000 PG/ML (ref 211–911)
WBC # BLD AUTO: 12.5 X10*3/UL (ref 4.4–11.3)

## 2024-11-11 PROCEDURE — 82728 ASSAY OF FERRITIN: CPT

## 2024-11-11 PROCEDURE — 85025 COMPLETE CBC W/AUTO DIFF WBC: CPT

## 2024-11-11 PROCEDURE — 84439 ASSAY OF FREE THYROXINE: CPT

## 2024-11-11 PROCEDURE — 82024 ASSAY OF ACTH: CPT

## 2024-11-11 PROCEDURE — 82627 DEHYDROEPIANDROSTERONE: CPT

## 2024-11-11 PROCEDURE — 36415 COLL VENOUS BLD VENIPUNCTURE: CPT

## 2024-11-11 PROCEDURE — 84443 ASSAY THYROID STIM HORMONE: CPT

## 2024-11-11 PROCEDURE — 84207 ASSAY OF VITAMIN B-6: CPT

## 2024-11-11 PROCEDURE — 80053 COMPREHEN METABOLIC PANEL: CPT

## 2024-11-11 PROCEDURE — 82607 VITAMIN B-12: CPT

## 2024-11-11 PROCEDURE — 83550 IRON BINDING TEST: CPT

## 2024-11-11 PROCEDURE — 82533 TOTAL CORTISOL: CPT

## 2024-11-11 PROCEDURE — 83540 ASSAY OF IRON: CPT

## 2024-11-11 PROCEDURE — 82746 ASSAY OF FOLIC ACID SERUM: CPT

## 2024-11-13 DIAGNOSIS — E06.3 HASHIMOTO'S THYROIDITIS: Primary | ICD-10-CM

## 2024-11-13 LAB — ACTH PLAS-MCNC: 37.1 PG/ML (ref 7.2–63.3)

## 2024-11-15 LAB — PYRIDOXAL PHOS SERPL-SCNC: 43.8 NMOL/L (ref 20–125)

## 2024-12-30 DIAGNOSIS — D50.8 IRON DEFICIENCY ANEMIA SECONDARY TO INADEQUATE DIETARY IRON INTAKE: Primary | ICD-10-CM

## 2024-12-30 DIAGNOSIS — E06.3 HASHIMOTO'S THYROIDITIS: Primary | ICD-10-CM

## 2024-12-30 DIAGNOSIS — K90.49 MALABSORPTION DUE TO INTOLERANCE, NOT ELSEWHERE CLASSIFIED: ICD-10-CM

## 2025-01-06 ENCOUNTER — LAB (OUTPATIENT)
Dept: LAB | Facility: LAB | Age: 25
End: 2025-01-06
Payer: COMMERCIAL

## 2025-01-06 DIAGNOSIS — K90.49 MALABSORPTION DUE TO INTOLERANCE, NOT ELSEWHERE CLASSIFIED: ICD-10-CM

## 2025-01-06 DIAGNOSIS — D50.8 IRON DEFICIENCY ANEMIA SECONDARY TO INADEQUATE DIETARY IRON INTAKE: ICD-10-CM

## 2025-01-06 DIAGNOSIS — E06.3 HASHIMOTO'S THYROIDITIS: ICD-10-CM

## 2025-01-06 DIAGNOSIS — K29.40 AUTOIMMUNE GASTRITIS: Primary | ICD-10-CM

## 2025-01-06 LAB
FERRITIN SERPL-MCNC: 39 NG/ML (ref 8–150)
IRON SATN MFR SERPL: 17 % (ref 25–45)
IRON SERPL-MCNC: 58 UG/DL (ref 35–150)
T3FREE SERPL-MCNC: 3.3 PG/ML (ref 2.3–4.2)
TIBC SERPL-MCNC: 338 UG/DL (ref 240–445)
TSH SERPL-ACNC: 1.16 MIU/L (ref 0.44–3.98)
UIBC SERPL-MCNC: 280 UG/DL (ref 110–370)

## 2025-01-06 PROCEDURE — 83540 ASSAY OF IRON: CPT

## 2025-01-06 PROCEDURE — 82728 ASSAY OF FERRITIN: CPT

## 2025-01-06 PROCEDURE — 83550 IRON BINDING TEST: CPT

## 2025-01-06 PROCEDURE — 84443 ASSAY THYROID STIM HORMONE: CPT

## 2025-01-06 PROCEDURE — 84481 FREE ASSAY (FT-3): CPT

## 2025-01-06 RX ORDER — FAMOTIDINE 10 MG/ML
20 INJECTION INTRAVENOUS ONCE AS NEEDED
OUTPATIENT
Start: 2025-01-13

## 2025-01-06 RX ORDER — EPINEPHRINE 0.3 MG/.3ML
0.3 INJECTION SUBCUTANEOUS EVERY 5 MIN PRN
OUTPATIENT
Start: 2025-01-13

## 2025-01-06 RX ORDER — DIPHENHYDRAMINE HYDROCHLORIDE 50 MG/ML
50 INJECTION INTRAMUSCULAR; INTRAVENOUS AS NEEDED
OUTPATIENT
Start: 2025-01-13

## 2025-01-06 RX ORDER — ALBUTEROL SULFATE 0.83 MG/ML
3 SOLUTION RESPIRATORY (INHALATION) AS NEEDED
OUTPATIENT
Start: 2025-01-13

## 2025-01-27 ENCOUNTER — TELEPHONE (OUTPATIENT)
Dept: HEMATOLOGY/ONCOLOGY | Facility: HOSPITAL | Age: 25
End: 2025-01-27
Payer: COMMERCIAL

## 2025-01-27 DIAGNOSIS — D50.8 IRON DEFICIENCY ANEMIA SECONDARY TO INADEQUATE DIETARY IRON INTAKE: ICD-10-CM

## 2025-01-27 DIAGNOSIS — K29.40 AUTOIMMUNE GASTRITIS: Primary | ICD-10-CM

## 2025-01-27 DIAGNOSIS — K90.49 MALABSORPTION DUE TO INTOLERANCE, NOT ELSEWHERE CLASSIFIED: ICD-10-CM

## 2025-01-27 NOTE — TELEPHONE ENCOUNTER
Patient called back to schedule her Iron. Patients first infusion is 1/10/2025. Patient verbalized and agreed to appointments.

## 2025-01-27 NOTE — TELEPHONE ENCOUNTER
Attempted to reach patient regarding her need for Iron Infusions. Left message for patient with call back number to call at her earliest convenience.

## 2025-01-31 ENCOUNTER — HOSPITAL ENCOUNTER (OUTPATIENT)
Dept: RADIOLOGY | Facility: HOSPITAL | Age: 25
Discharge: HOME | End: 2025-01-31
Payer: COMMERCIAL

## 2025-01-31 DIAGNOSIS — R76.8 THYROID ANTIBODY POSITIVE: ICD-10-CM

## 2025-01-31 PROCEDURE — 76536 US EXAM OF HEAD AND NECK: CPT

## 2025-02-07 ENCOUNTER — APPOINTMENT (OUTPATIENT)
Dept: HEMATOLOGY/ONCOLOGY | Facility: HOSPITAL | Age: 25
End: 2025-02-07
Payer: COMMERCIAL

## 2025-02-10 ENCOUNTER — APPOINTMENT (OUTPATIENT)
Dept: HEMATOLOGY/ONCOLOGY | Facility: HOSPITAL | Age: 25
End: 2025-02-10
Payer: COMMERCIAL

## 2025-02-11 ENCOUNTER — APPOINTMENT (OUTPATIENT)
Dept: HEMATOLOGY/ONCOLOGY | Facility: HOSPITAL | Age: 25
End: 2025-02-11
Payer: COMMERCIAL

## 2025-02-14 ENCOUNTER — APPOINTMENT (OUTPATIENT)
Dept: HEMATOLOGY/ONCOLOGY | Facility: HOSPITAL | Age: 25
End: 2025-02-14
Payer: COMMERCIAL

## 2025-02-17 ENCOUNTER — INFUSION (OUTPATIENT)
Dept: HEMATOLOGY/ONCOLOGY | Facility: HOSPITAL | Age: 25
End: 2025-02-17
Payer: COMMERCIAL

## 2025-02-17 VITALS
HEART RATE: 83 BPM | WEIGHT: 190.04 LBS | TEMPERATURE: 98.4 F | BODY MASS INDEX: 30.67 KG/M2 | OXYGEN SATURATION: 99 % | SYSTOLIC BLOOD PRESSURE: 137 MMHG | RESPIRATION RATE: 16 BRPM | DIASTOLIC BLOOD PRESSURE: 80 MMHG

## 2025-02-17 DIAGNOSIS — D50.8 IRON DEFICIENCY ANEMIA SECONDARY TO INADEQUATE DIETARY IRON INTAKE: ICD-10-CM

## 2025-02-17 DIAGNOSIS — K29.40 AUTOIMMUNE GASTRITIS: ICD-10-CM

## 2025-02-17 DIAGNOSIS — K90.49 MALABSORPTION DUE TO INTOLERANCE, NOT ELSEWHERE CLASSIFIED: ICD-10-CM

## 2025-02-17 PROCEDURE — 96374 THER/PROPH/DIAG INJ IV PUSH: CPT | Mod: INF

## 2025-02-17 PROCEDURE — 2500000004 HC RX 250 GENERAL PHARMACY W/ HCPCS (ALT 636 FOR OP/ED)

## 2025-02-17 RX ORDER — ALBUTEROL SULFATE 0.83 MG/ML
3 SOLUTION RESPIRATORY (INHALATION) AS NEEDED
Status: CANCELLED | OUTPATIENT
Start: 2025-02-20

## 2025-02-17 RX ORDER — HEPARIN 100 UNIT/ML
500 SYRINGE INTRAVENOUS AS NEEDED
Status: CANCELLED | OUTPATIENT
Start: 2025-02-17

## 2025-02-17 RX ORDER — HEPARIN SODIUM,PORCINE/PF 10 UNIT/ML
50 SYRINGE (ML) INTRAVENOUS AS NEEDED
Status: CANCELLED | OUTPATIENT
Start: 2025-02-17

## 2025-02-17 RX ORDER — DIPHENHYDRAMINE HYDROCHLORIDE 50 MG/ML
50 INJECTION INTRAMUSCULAR; INTRAVENOUS AS NEEDED
Status: CANCELLED | OUTPATIENT
Start: 2025-02-20

## 2025-02-17 RX ORDER — FAMOTIDINE 10 MG/ML
20 INJECTION, SOLUTION INTRAVENOUS ONCE AS NEEDED
Status: CANCELLED | OUTPATIENT
Start: 2025-02-20

## 2025-02-17 RX ORDER — EPINEPHRINE 0.3 MG/.3ML
0.3 INJECTION SUBCUTANEOUS EVERY 5 MIN PRN
Status: CANCELLED | OUTPATIENT
Start: 2025-02-20

## 2025-02-17 RX ADMIN — IRON SUCROSE 200 MG: 20 INJECTION, SOLUTION INTRAVENOUS at 14:44

## 2025-02-17 ASSESSMENT — PATIENT HEALTH QUESTIONNAIRE - PHQ9
2. FEELING DOWN, DEPRESSED OR HOPELESS: SEVERAL DAYS
1. LITTLE INTEREST OR PLEASURE IN DOING THINGS: SEVERAL DAYS
10. IF YOU CHECKED OFF ANY PROBLEMS, HOW DIFFICULT HAVE THESE PROBLEMS MADE IT FOR YOU TO DO YOUR WORK, TAKE CARE OF THINGS AT HOME, OR GET ALONG WITH OTHER PEOPLE: SOMEWHAT DIFFICULT
SUM OF ALL RESPONSES TO PHQ9 QUESTIONS 1 & 2: 2

## 2025-02-17 ASSESSMENT — PAIN SCALES - GENERAL: PAINLEVEL_OUTOF10: 0-NO PAIN

## 2025-02-18 DIAGNOSIS — E53.8 B12 DEFICIENCY: Primary | ICD-10-CM

## 2025-02-21 ENCOUNTER — INFUSION (OUTPATIENT)
Dept: HEMATOLOGY/ONCOLOGY | Facility: HOSPITAL | Age: 25
End: 2025-02-21
Payer: COMMERCIAL

## 2025-02-21 VITALS
HEART RATE: 88 BPM | TEMPERATURE: 98.4 F | OXYGEN SATURATION: 98 % | SYSTOLIC BLOOD PRESSURE: 139 MMHG | DIASTOLIC BLOOD PRESSURE: 81 MMHG | RESPIRATION RATE: 18 BRPM

## 2025-02-21 DIAGNOSIS — K90.49 MALABSORPTION DUE TO INTOLERANCE, NOT ELSEWHERE CLASSIFIED: ICD-10-CM

## 2025-02-21 DIAGNOSIS — D50.8 IRON DEFICIENCY ANEMIA SECONDARY TO INADEQUATE DIETARY IRON INTAKE: ICD-10-CM

## 2025-02-21 DIAGNOSIS — K29.40 AUTOIMMUNE GASTRITIS: ICD-10-CM

## 2025-02-21 PROCEDURE — 2500000004 HC RX 250 GENERAL PHARMACY W/ HCPCS (ALT 636 FOR OP/ED)

## 2025-02-21 PROCEDURE — 96374 THER/PROPH/DIAG INJ IV PUSH: CPT | Mod: INF

## 2025-02-21 RX ORDER — ALBUTEROL SULFATE 0.83 MG/ML
3 SOLUTION RESPIRATORY (INHALATION) AS NEEDED
OUTPATIENT
Start: 2025-02-23

## 2025-02-21 RX ORDER — FAMOTIDINE 10 MG/ML
20 INJECTION, SOLUTION INTRAVENOUS ONCE AS NEEDED
OUTPATIENT
Start: 2025-02-23

## 2025-02-21 RX ORDER — EPINEPHRINE 0.3 MG/.3ML
0.3 INJECTION SUBCUTANEOUS EVERY 5 MIN PRN
OUTPATIENT
Start: 2025-02-23

## 2025-02-21 RX ORDER — DIPHENHYDRAMINE HYDROCHLORIDE 50 MG/ML
50 INJECTION INTRAMUSCULAR; INTRAVENOUS AS NEEDED
OUTPATIENT
Start: 2025-02-23

## 2025-02-21 RX ORDER — HEPARIN 100 UNIT/ML
500 SYRINGE INTRAVENOUS AS NEEDED
OUTPATIENT
Start: 2025-02-21

## 2025-02-21 RX ORDER — HEPARIN SODIUM,PORCINE/PF 10 UNIT/ML
50 SYRINGE (ML) INTRAVENOUS AS NEEDED
OUTPATIENT
Start: 2025-02-21

## 2025-02-21 RX ADMIN — IRON SUCROSE 200 MG: 20 INJECTION, SOLUTION INTRAVENOUS at 14:57

## 2025-02-21 ASSESSMENT — LIFESTYLE VARIABLES
AUDIT-C TOTAL SCORE: 1
HOW OFTEN DO YOU HAVE A DRINK CONTAINING ALCOHOL: MONTHLY OR LESS
HOW MANY STANDARD DRINKS CONTAINING ALCOHOL DO YOU HAVE ON A TYPICAL DAY: 1 OR 2
HOW OFTEN DO YOU HAVE SIX OR MORE DRINKS ON ONE OCCASION: NEVER
SKIP TO QUESTIONS 9-10: 1

## 2025-02-21 ASSESSMENT — PAIN SCALES - GENERAL: PAINLEVEL_OUTOF10: 0-NO PAIN

## 2025-02-24 ENCOUNTER — APPOINTMENT (OUTPATIENT)
Dept: HEMATOLOGY/ONCOLOGY | Facility: HOSPITAL | Age: 25
End: 2025-02-24
Payer: COMMERCIAL

## 2025-02-25 ENCOUNTER — INFUSION (OUTPATIENT)
Dept: HEMATOLOGY/ONCOLOGY | Facility: HOSPITAL | Age: 25
End: 2025-02-25
Payer: COMMERCIAL

## 2025-02-25 VITALS
TEMPERATURE: 98.2 F | DIASTOLIC BLOOD PRESSURE: 88 MMHG | HEART RATE: 90 BPM | SYSTOLIC BLOOD PRESSURE: 130 MMHG | RESPIRATION RATE: 18 BRPM | OXYGEN SATURATION: 98 %

## 2025-02-25 DIAGNOSIS — D50.8 IRON DEFICIENCY ANEMIA SECONDARY TO INADEQUATE DIETARY IRON INTAKE: ICD-10-CM

## 2025-02-25 DIAGNOSIS — K90.49 MALABSORPTION DUE TO INTOLERANCE, NOT ELSEWHERE CLASSIFIED: ICD-10-CM

## 2025-02-25 DIAGNOSIS — K29.40 AUTOIMMUNE GASTRITIS: ICD-10-CM

## 2025-02-25 PROCEDURE — 96374 THER/PROPH/DIAG INJ IV PUSH: CPT | Mod: INF

## 2025-02-25 PROCEDURE — 2500000004 HC RX 250 GENERAL PHARMACY W/ HCPCS (ALT 636 FOR OP/ED)

## 2025-02-25 RX ORDER — HEPARIN 100 UNIT/ML
500 SYRINGE INTRAVENOUS AS NEEDED
Status: CANCELLED | OUTPATIENT
Start: 2025-02-25

## 2025-02-25 RX ORDER — HEPARIN SODIUM,PORCINE/PF 10 UNIT/ML
50 SYRINGE (ML) INTRAVENOUS AS NEEDED
Status: CANCELLED | OUTPATIENT
Start: 2025-02-25

## 2025-02-25 RX ORDER — FAMOTIDINE 10 MG/ML
20 INJECTION, SOLUTION INTRAVENOUS ONCE AS NEEDED
Status: CANCELLED | OUTPATIENT
Start: 2025-02-27

## 2025-02-25 RX ORDER — EPINEPHRINE 0.3 MG/.3ML
0.3 INJECTION SUBCUTANEOUS EVERY 5 MIN PRN
Status: CANCELLED | OUTPATIENT
Start: 2025-02-27

## 2025-02-25 RX ORDER — DIPHENHYDRAMINE HYDROCHLORIDE 50 MG/ML
50 INJECTION INTRAMUSCULAR; INTRAVENOUS AS NEEDED
Status: CANCELLED | OUTPATIENT
Start: 2025-02-27

## 2025-02-25 RX ORDER — ALBUTEROL SULFATE 0.83 MG/ML
3 SOLUTION RESPIRATORY (INHALATION) AS NEEDED
Status: CANCELLED | OUTPATIENT
Start: 2025-02-27

## 2025-02-25 RX ADMIN — IRON SUCROSE 200 MG: 20 INJECTION, SOLUTION INTRAVENOUS at 14:59

## 2025-02-25 ASSESSMENT — PAIN SCALES - GENERAL: PAINLEVEL_OUTOF10: 0-NO PAIN

## 2025-02-28 ENCOUNTER — INFUSION (OUTPATIENT)
Dept: HEMATOLOGY/ONCOLOGY | Facility: HOSPITAL | Age: 25
End: 2025-02-28
Payer: COMMERCIAL

## 2025-02-28 VITALS
HEART RATE: 84 BPM | SYSTOLIC BLOOD PRESSURE: 118 MMHG | OXYGEN SATURATION: 97 % | DIASTOLIC BLOOD PRESSURE: 76 MMHG | TEMPERATURE: 98.2 F | RESPIRATION RATE: 18 BRPM

## 2025-02-28 DIAGNOSIS — K29.40 AUTOIMMUNE GASTRITIS: ICD-10-CM

## 2025-02-28 DIAGNOSIS — K90.49 MALABSORPTION DUE TO INTOLERANCE, NOT ELSEWHERE CLASSIFIED: ICD-10-CM

## 2025-02-28 DIAGNOSIS — D50.8 IRON DEFICIENCY ANEMIA SECONDARY TO INADEQUATE DIETARY IRON INTAKE: ICD-10-CM

## 2025-02-28 PROCEDURE — 2500000004 HC RX 250 GENERAL PHARMACY W/ HCPCS (ALT 636 FOR OP/ED)

## 2025-02-28 PROCEDURE — 96374 THER/PROPH/DIAG INJ IV PUSH: CPT | Mod: INF

## 2025-02-28 RX ORDER — HEPARIN 100 UNIT/ML
500 SYRINGE INTRAVENOUS AS NEEDED
OUTPATIENT
Start: 2025-02-28

## 2025-02-28 RX ORDER — DIPHENHYDRAMINE HYDROCHLORIDE 50 MG/ML
50 INJECTION INTRAMUSCULAR; INTRAVENOUS AS NEEDED
OUTPATIENT
Start: 2025-03-03

## 2025-02-28 RX ORDER — FAMOTIDINE 10 MG/ML
20 INJECTION, SOLUTION INTRAVENOUS ONCE AS NEEDED
OUTPATIENT
Start: 2025-03-03

## 2025-02-28 RX ORDER — EPINEPHRINE 0.3 MG/.3ML
0.3 INJECTION SUBCUTANEOUS EVERY 5 MIN PRN
OUTPATIENT
Start: 2025-03-03

## 2025-02-28 RX ORDER — HEPARIN SODIUM,PORCINE/PF 10 UNIT/ML
50 SYRINGE (ML) INTRAVENOUS AS NEEDED
OUTPATIENT
Start: 2025-02-28

## 2025-02-28 RX ORDER — ALBUTEROL SULFATE 0.83 MG/ML
3 SOLUTION RESPIRATORY (INHALATION) AS NEEDED
OUTPATIENT
Start: 2025-03-03

## 2025-02-28 RX ADMIN — IRON SUCROSE 200 MG: 20 INJECTION, SOLUTION INTRAVENOUS at 14:39

## 2025-02-28 ASSESSMENT — PAIN SCALES - GENERAL: PAINLEVEL_OUTOF10: 0-NO PAIN

## 2025-03-03 ENCOUNTER — APPOINTMENT (OUTPATIENT)
Dept: HEMATOLOGY/ONCOLOGY | Facility: HOSPITAL | Age: 25
End: 2025-03-03
Payer: COMMERCIAL

## 2025-03-04 ENCOUNTER — INFUSION (OUTPATIENT)
Dept: HEMATOLOGY/ONCOLOGY | Facility: HOSPITAL | Age: 25
End: 2025-03-04
Payer: COMMERCIAL

## 2025-03-04 ENCOUNTER — OFFICE VISIT (OUTPATIENT)
Dept: HEMATOLOGY/ONCOLOGY | Facility: HOSPITAL | Age: 25
End: 2025-03-04
Payer: COMMERCIAL

## 2025-03-04 VITALS
TEMPERATURE: 97.3 F | DIASTOLIC BLOOD PRESSURE: 77 MMHG | SYSTOLIC BLOOD PRESSURE: 120 MMHG | OXYGEN SATURATION: 98 % | HEART RATE: 71 BPM | RESPIRATION RATE: 18 BRPM

## 2025-03-04 VITALS
SYSTOLIC BLOOD PRESSURE: 120 MMHG | OXYGEN SATURATION: 97 % | DIASTOLIC BLOOD PRESSURE: 77 MMHG | TEMPERATURE: 97.3 F | HEART RATE: 91 BPM | RESPIRATION RATE: 18 BRPM

## 2025-03-04 DIAGNOSIS — K29.40 AUTOIMMUNE GASTRITIS: ICD-10-CM

## 2025-03-04 DIAGNOSIS — K90.49 MALABSORPTION DUE TO INTOLERANCE, NOT ELSEWHERE CLASSIFIED: ICD-10-CM

## 2025-03-04 DIAGNOSIS — D50.8 IRON DEFICIENCY ANEMIA SECONDARY TO INADEQUATE DIETARY IRON INTAKE: ICD-10-CM

## 2025-03-04 DIAGNOSIS — K29.40 AUTOIMMUNE GASTRITIS: Primary | ICD-10-CM

## 2025-03-04 PROCEDURE — 96374 THER/PROPH/DIAG INJ IV PUSH: CPT | Mod: INF

## 2025-03-04 PROCEDURE — 99214 OFFICE O/P EST MOD 30 MIN: CPT | Performed by: INTERNAL MEDICINE

## 2025-03-04 PROCEDURE — 1036F TOBACCO NON-USER: CPT | Performed by: INTERNAL MEDICINE

## 2025-03-04 PROCEDURE — 2500000004 HC RX 250 GENERAL PHARMACY W/ HCPCS (ALT 636 FOR OP/ED)

## 2025-03-04 RX ORDER — ALBUTEROL SULFATE 0.83 MG/ML
3 SOLUTION RESPIRATORY (INHALATION) AS NEEDED
Status: CANCELLED | OUTPATIENT
Start: 2025-03-06

## 2025-03-04 RX ORDER — DIPHENHYDRAMINE HYDROCHLORIDE 50 MG/ML
50 INJECTION INTRAMUSCULAR; INTRAVENOUS AS NEEDED
Status: CANCELLED | OUTPATIENT
Start: 2025-05-06

## 2025-03-04 RX ORDER — ALBUTEROL SULFATE 0.83 MG/ML
3 SOLUTION RESPIRATORY (INHALATION) AS NEEDED
Status: CANCELLED | OUTPATIENT
Start: 2025-05-06

## 2025-03-04 RX ORDER — FAMOTIDINE 10 MG/ML
20 INJECTION, SOLUTION INTRAVENOUS ONCE AS NEEDED
Status: CANCELLED | OUTPATIENT
Start: 2025-03-06

## 2025-03-04 RX ORDER — EPINEPHRINE 0.3 MG/.3ML
0.3 INJECTION SUBCUTANEOUS EVERY 5 MIN PRN
Status: CANCELLED | OUTPATIENT
Start: 2025-03-06

## 2025-03-04 RX ORDER — EPINEPHRINE 0.3 MG/.3ML
0.3 INJECTION SUBCUTANEOUS EVERY 5 MIN PRN
Status: CANCELLED | OUTPATIENT
Start: 2025-05-06

## 2025-03-04 RX ORDER — DIPHENHYDRAMINE HYDROCHLORIDE 50 MG/ML
50 INJECTION INTRAMUSCULAR; INTRAVENOUS AS NEEDED
Status: CANCELLED | OUTPATIENT
Start: 2025-03-06

## 2025-03-04 RX ORDER — FAMOTIDINE 10 MG/ML
20 INJECTION, SOLUTION INTRAVENOUS ONCE AS NEEDED
Status: CANCELLED | OUTPATIENT
Start: 2025-05-06

## 2025-03-04 RX ADMIN — IRON SUCROSE 200 MG: 20 INJECTION, SOLUTION INTRAVENOUS at 14:38

## 2025-03-04 ASSESSMENT — ENCOUNTER SYMPTOMS
RESPIRATORY NEGATIVE: 1
UNEXPECTED WEIGHT CHANGE: 0
GASTROINTESTINAL NEGATIVE: 1
FEVER: 0
CARDIOVASCULAR NEGATIVE: 1
FATIGUE: 1

## 2025-03-04 ASSESSMENT — PAIN SCALES - GENERAL
PAINLEVEL_OUTOF10: 0-NO PAIN
PAINLEVEL_OUTOF10: 0-NO PAIN

## 2025-03-04 NOTE — PROGRESS NOTES
Patient ID: Elizabeth Toro is a 24 y.o. female.    Subjective:  Returns for follow up for iron deficiency anemia. Has known autoimmune/pernicious anemia + Hashimoto's + heavy periods. She recently received venofer 200 mg iv x 5 doses. Last dose was today and already feels better. Last B12 injection was 3 months ago.     Assessment/Plan:  ? Iron deficiency: Due to autoimmune gastritis. Has required iv venofer in June and Oct 2024, and now in Feb 2025 x 5 doses. She will likely require it regularly so we will try her on 300 mg every other month dosing. This is due to poor absorption + heavy periods. Will see Gyn soon again.     She also needs b12 monthly due to pernicious anemia. I prescribed this. Will repeat her labs in 6 months.     Review Of Systems:  Review of Systems   Constitutional:  Positive for fatigue. Negative for fever and unexpected weight change.   HENT:  Negative.     Respiratory: Negative.     Cardiovascular: Negative.    Gastrointestinal: Negative.        Physical Exam:  /77   Pulse 71   Temp 36.3 °C (97.3 °F) (Temporal)   Resp 18   SpO2 98%   BSA: There is no height or weight on file to calculate BSA.  Performance Status: Asymptomatic  Physical Exam  HENT:      Head: Normocephalic and atraumatic.   Eyes:      General: No scleral icterus.  Pulmonary:      Effort: Pulmonary effort is normal.   Musculoskeletal:         General: Normal range of motion.   Skin:     Coloration: Skin is not jaundiced.   Neurological:      General: No focal deficit present.      Mental Status: She is alert and oriented to person, place, and time.         Results:  Diagnostic Results   Lab Results   Component Value Date    WBC 12.5 (H) 11/11/2024    HGB 13.6 11/11/2024    HCT 40.7 11/11/2024    MCV 85 11/11/2024     11/11/2024     Lab Results   Component Value Date    CALCIUM 9.7 11/11/2024     11/11/2024    K 3.8 11/11/2024    CO2 30 11/11/2024     11/11/2024    BUN 14 11/11/2024    CREATININE  0.58 11/11/2024    ALT 8 11/11/2024    AST 13 11/11/2024       Current Outpatient Medications:     calcium polycarbophiL (Fibercon) 625 mg tablet, Take 1 tablet (625 mg) by mouth once daily. Take one pill once a day and go up by one or down by one as needed for constipation, Disp: 60 tablet, Rfl: 0    cetirizine (ZyrTEC) 10 mg tablet, Take 1 tablet (10 mg) by mouth once daily., Disp: , Rfl:     copper gluconate 2 mg tablet, Take 2 mg by mouth once daily., Disp: , Rfl:     famotidine (Pepcid) 20 mg tablet, Take by mouth., Disp: , Rfl:     fluticasone (Flonase) 50 mcg/actuation nasal spray, Administer 2 sprays into each nostril once every 24 hours., Disp: , Rfl:     hydrOXYzine HCL (Atarax) 50 mg tablet, Take 1 tablet (50 mg) by mouth if needed., Disp: , Rfl:     ibuprofen 200 mg tablet, Take 3 tablets (600 mg) by mouth every 6 hours if needed., Disp: , Rfl:     levothyroxine (Synthroid, Levoxyl) 100 mcg tablet, Take 1 tablet (100 mcg) by mouth early in the morning.. Take on an empty stomach at the same time each day, either 30 to 60 minutes prior to breakfast, Disp: 30 tablet, Rfl: 5    ondansetron (Zofran) 4 mg tablet, Take 1 tablet (4 mg) by mouth every 6 hours., Disp: , Rfl:     pantoprazole (ProtoNix) 20 mg EC tablet, Take 1 tablet (20 mg) by mouth once daily in the morning. Take before meals. Do not crush, chew, or split., Disp: , Rfl:     sertraline (Zoloft) 25 mg tablet, Take 1 tablet (25 mg) by mouth once daily., Disp: , Rfl:     Sucraid 8,500 unit/mL solution, , Disp: , Rfl:     traZODone (Desyrel) 50 mg tablet, Take 1 tablet (50 mg) by mouth once daily at bedtime., Disp: , Rfl:     triamcinolone (Kenalog) 0.5 % ointment, twice a day.  APPLY SPARINGLY TO AFFECTED AREA(S), Disp: , Rfl:     valACYclovir (Valtrex) 500 mg tablet, Take 1 tablet (500 mg) by mouth if needed., Disp: , Rfl:     cyanocobalamin, vitamin B-12, 1,000 mcg/mL kit, Inject 1,000 mcg as directed every 28 (twenty-eight) days., Disp: 1 kit,  Rfl: 11  No current facility-administered medications for this visit.     No past surgical history on file.  Family History   Problem Relation Name Age of Onset    Colon cancer Maternal Grandfather Lewis     Prostate cancer Maternal Grandfather Lewis     Diabetes Maternal Grandmother Leeanne     Breast cancer Maternal Grandmother Leeanne     Multiple sclerosis Father's Sister Smita     Thyroid disease Father's Brother Joshua       reports that she has never smoked. She has never been exposed to tobacco smoke. She has never used smokeless tobacco.    Diagnoses and all orders for this visit:  Autoimmune gastritis  -     Clinic Appointment Request Follow Up (Dr Mcmahon)  -     cyanocobalamin, vitamin B-12, 1,000 mcg/mL kit; Inject 1,000 mcg as directed every 28 (twenty-eight) days.  Malabsorption due to intolerance, not elsewhere classified  -     Clinic Appointment Request Follow Up (Dr Mcmahon)  -     cyanocobalamin, vitamin B-12, 1,000 mcg/mL kit; Inject 1,000 mcg as directed every 28 (twenty-eight) days.  Iron deficiency anemia secondary to inadequate dietary iron intake  -     Clinic Appointment Request Follow Up (Dr Mcmahon)  -     cyanocobalamin, vitamin B-12, 1,000 mcg/mL kit; Inject 1,000 mcg as directed every 28 (twenty-eight) days.  Other orders  -     iron sucrose (Venofer) 300 mg in sodium chloride 0.9% 250 mL IV  -     sodium chloride 0.9 % bolus 500 mL  -     dextrose 5 % in water (D5W) bolus 500 mL  -     diphenhydrAMINE (BENADryl) injection 50 mg  -     methylPREDNISolone sod succinate (SOLU-Medrol) 40 mg/mL injection 40 mg  -     famotidine PF (Pepcid) injection 20 mg  -     EPINEPHrine (Epipen) injection syringe 0.3 mg  -     albuterol 2.5 mg /3 mL (0.083 %) nebulizer solution 3 mL       Morgan Mendez MD

## 2025-03-05 RX ORDER — DIPHENHYDRAMINE HYDROCHLORIDE 50 MG/ML
50 INJECTION INTRAMUSCULAR; INTRAVENOUS AS NEEDED
OUTPATIENT
Start: 2025-05-05

## 2025-03-05 RX ORDER — FAMOTIDINE 10 MG/ML
20 INJECTION, SOLUTION INTRAVENOUS ONCE AS NEEDED
OUTPATIENT
Start: 2025-05-05

## 2025-03-05 RX ORDER — EPINEPHRINE 0.3 MG/.3ML
0.3 INJECTION SUBCUTANEOUS EVERY 5 MIN PRN
OUTPATIENT
Start: 2025-05-05

## 2025-03-05 RX ORDER — ALBUTEROL SULFATE 0.83 MG/ML
3 SOLUTION RESPIRATORY (INHALATION) AS NEEDED
OUTPATIENT
Start: 2025-05-05

## 2025-03-12 ENCOUNTER — TELEPHONE (OUTPATIENT)
Dept: ENDOCRINOLOGY | Facility: CLINIC | Age: 25
End: 2025-03-12

## 2025-03-12 ENCOUNTER — APPOINTMENT (OUTPATIENT)
Dept: ENDOCRINOLOGY | Facility: CLINIC | Age: 25
End: 2025-03-12
Payer: COMMERCIAL

## 2025-03-12 NOTE — TELEPHONE ENCOUNTER
Elizabeth called to say that she forgot about her appt today 03/12/2025 with  and is working. Furthermore, I was looking back at her last Chart Note and it stated that  wanted her to see Cailin Perez. Therefore, I scheduled her with her instead of rescheduling her with

## 2025-03-18 ENCOUNTER — TELEPHONE (OUTPATIENT)
Dept: HEMATOLOGY/ONCOLOGY | Facility: HOSPITAL | Age: 25
End: 2025-03-18
Payer: COMMERCIAL

## 2025-04-07 NOTE — PROGRESS NOTES
HPI   23 yo with subclinical hashimoto's disease, autoimmune gastritis, b12 def (on b12 injections/iron infusions) presents for follow up.  had seen endo/Dr. Sexton in the past year    - lost 10 pounds since last visit following 1800 calorie diet, using my fitness pal, often with high carb meals/snack, daily dinner dessert smoothie 65 carbs    -taking levothyroxine 100 mcg daily currently     Initial visit: 6/04/2024  Pt with tsh-3.84, normal t4/t3, +tpo earlier this year, see labs below from 2024.  Thyroid ultrasound 4/2024: R 8mm TR4 nodule (pt's mother with hx of thyroid cancer)  Pt with fatigue/brain fog/wt regain.    Also with question of reactive hypoglycemia middle of the day.  -suggested pt could start a trial of levothyroxine 50 mcg and repeat labs and follow up  -she did not do this    11/08/2024 visit:  -pt had IV contrast for abdominal CT oct 2024  -pt messaged after having this with fatigue/brain fog/confusion/weakness  -had oct 2024 labs with tsh-41, we sent pt rx for 100 mcg levothyroxine  -pt recalls having similar sx in spring 2023 after IV contrast but no labs were drawn at this time  -taking levothyroxine 100 mcg daily currently    -pt has questions about female hormones-she has normal periods, no hirsutism, no acne  -pt has questions about adrenal insufficiency-no signs of excessive cortisol, can screen for adrenal insufficiency  -questions of other thyroid formulations-would consider if synthetic isn't working        Current Outpatient Medications:     calcium polycarbophiL (Fibercon) 625 mg tablet, Take 1 tablet (625 mg) by mouth once daily. Take one pill once a day and go up by one or down by one as needed for constipation, Disp: 60 tablet, Rfl: 0    copper gluconate 2 mg tablet, Take 2 mg by mouth once daily., Disp: , Rfl:     cyanocobalamin, vitamin B-12, 1,000 mcg/mL kit, Inject 1,000 mcg as directed every 28 (twenty-eight) days., Disp: 1 kit, Rfl: 11    fluticasone (Flonase) 50  mcg/actuation nasal spray, Administer 2 sprays into each nostril once every 24 hours., Disp: , Rfl:     hydrOXYzine HCL (Atarax) 50 mg tablet, Take 1 tablet (50 mg) by mouth if needed., Disp: , Rfl:     levothyroxine (Synthroid, Levoxyl) 100 mcg tablet, Take 1 tablet (100 mcg) by mouth early in the morning.. Take on an empty stomach at the same time each day, either 30 to 60 minutes prior to breakfast, Disp: 30 tablet, Rfl: 5    ondansetron (Zofran) 4 mg tablet, Take 1 tablet (4 mg) by mouth every 6 hours., Disp: , Rfl:     pantoprazole (ProtoNix) 20 mg EC tablet, Take 1 tablet (20 mg) by mouth once daily in the morning. Take before meals. Do not crush, chew, or split., Disp: , Rfl:     sertraline (Zoloft) 25 mg tablet, Take 1 tablet (25 mg) by mouth once daily., Disp: , Rfl:     Sucraid 8,500 unit/mL solution, , Disp: , Rfl:     traZODone (Desyrel) 50 mg tablet, Take 1 tablet (50 mg) by mouth once daily at bedtime., Disp: , Rfl:     valACYclovir (Valtrex) 500 mg tablet, Take 1 tablet (500 mg) by mouth if needed., Disp: , Rfl:     cetirizine (ZyrTEC) 10 mg tablet, Take 1 tablet (10 mg) by mouth once daily. (Patient not taking: Reported on 4/9/2025), Disp: , Rfl:     Allergies as of 04/09/2025 - Reviewed 03/04/2025   Allergen Reaction Noted    House dust mite Other 02/24/2020    Iodine Other 11/08/2024    Lorazepam Unknown 11/24/2020    Milk GI Upset 10/06/2023    Morphine Unknown 10/06/2023       /66 (BP Location: Right arm, Patient Position: Sitting)   Pulse 78   Wt 83.6 kg (184 lb 3.2 oz)   BMI 29.73 kg/m²     Labs:   Lab Results   Component Value Date    WBC 12.5 (H) 11/11/2024    NRBC 0.0 11/11/2024    RBC 4.81 11/11/2024    HGB 13.6 11/11/2024    HCT 40.7 11/11/2024     11/11/2024     Lab Results   Component Value Date    CALCIUM 9.7 11/11/2024    AST 13 11/11/2024    ALKPHOS 67 11/11/2024    BILITOT 0.4 11/11/2024    PROT 7.5 11/11/2024    ALBUMIN 4.9 11/11/2024     11/11/2024    K 3.8  "11/11/2024     11/11/2024    CO2 30 11/11/2024    ANIONGAP 12 11/11/2024    BUN 14 11/11/2024    CREATININE 0.58 11/11/2024    GLUCOSE 95 11/11/2024    ALT 8 11/11/2024    EGFR >90 11/11/2024     No results found for: \"CHOL\", \"TRIG\", \"HDL\", \"LDLCALC\"  No results found for: \"MICROALBCREA\"  Lab Results   Component Value Date    TSH 1.16 01/06/2025     Lab Results   Component Value Date    [id number] >2,000 (H) 11/11/2024     Lab Results   Component Value Date    HGBA1C 4.6 09/17/2024       Assessment/Plan   1. Weight gain    -reviewed CHO foods, meal planning, reinforced carb limits to promote weight loss, reduce daily caloric intake to 1500 calories, continue to use my fitness pal  -reinforced high protein low carb meals to reduce reactive hypoglycemia symptoms  -benefits of increased activity reviewed, advised increasing daily activity, working up to 30 minutes most days      2. Hashimoto's thyroiditis  - 01/2025 thyroid us wnl  - TSH with reflex to Free T4 if abnormal; Future  - TSH with reflex to Free T4 if abnormal        Follow up: Dr. Lea 1 year    -labs/tests/notes reviewed  -reviewed and counseled patient on medication monitoring and side effects    Treatment and plan discussed with Dr. Lea. Lyubov LERNER Certified [employer] [facility] and       "

## 2025-04-09 ENCOUNTER — LAB (OUTPATIENT)
Dept: LAB | Facility: HOSPITAL | Age: 25
End: 2025-04-09
Payer: COMMERCIAL

## 2025-04-09 ENCOUNTER — APPOINTMENT (OUTPATIENT)
Dept: ENDOCRINOLOGY | Facility: CLINIC | Age: 25
End: 2025-04-09
Payer: COMMERCIAL

## 2025-04-09 VITALS
HEART RATE: 78 BPM | WEIGHT: 184.2 LBS | BODY MASS INDEX: 29.73 KG/M2 | DIASTOLIC BLOOD PRESSURE: 66 MMHG | SYSTOLIC BLOOD PRESSURE: 107 MMHG

## 2025-04-09 DIAGNOSIS — D50.8 IRON DEFICIENCY ANEMIA SECONDARY TO INADEQUATE DIETARY IRON INTAKE: ICD-10-CM

## 2025-04-09 DIAGNOSIS — R63.5 WEIGHT GAIN: ICD-10-CM

## 2025-04-09 DIAGNOSIS — K90.49 MALABSORPTION DUE TO INTOLERANCE, NOT ELSEWHERE CLASSIFIED: ICD-10-CM

## 2025-04-09 DIAGNOSIS — E06.3 HASHIMOTO'S THYROIDITIS: ICD-10-CM

## 2025-04-09 DIAGNOSIS — K29.40 AUTOIMMUNE GASTRITIS: ICD-10-CM

## 2025-04-09 LAB
ALBUMIN SERPL BCP-MCNC: 4.7 G/DL (ref 3.4–5)
ALP SERPL-CCNC: 58 U/L (ref 33–110)
ALT SERPL W P-5'-P-CCNC: 10 U/L (ref 7–45)
ANION GAP SERPL CALC-SCNC: 12 MMOL/L (ref 10–20)
AST SERPL W P-5'-P-CCNC: 12 U/L (ref 9–39)
BASOPHILS # BLD AUTO: 0.04 X10*3/UL (ref 0–0.1)
BASOPHILS NFR BLD AUTO: 0.5 %
BILIRUB SERPL-MCNC: 0.4 MG/DL (ref 0–1.2)
BUN SERPL-MCNC: 9 MG/DL (ref 6–23)
CALCIUM SERPL-MCNC: 9.7 MG/DL (ref 8.6–10.3)
CHLORIDE SERPL-SCNC: 103 MMOL/L (ref 98–107)
CO2 SERPL-SCNC: 28 MMOL/L (ref 21–32)
CREAT SERPL-MCNC: 0.64 MG/DL (ref 0.5–1.05)
EGFRCR SERPLBLD CKD-EPI 2021: >90 ML/MIN/1.73M*2
EOSINOPHIL # BLD AUTO: 0.34 X10*3/UL (ref 0–0.7)
EOSINOPHIL NFR BLD AUTO: 4.4 %
ERYTHROCYTE [DISTWIDTH] IN BLOOD BY AUTOMATED COUNT: 12.6 % (ref 11.5–14.5)
FERRITIN SERPL-MCNC: 218 NG/ML (ref 8–150)
GLUCOSE SERPL-MCNC: 95 MG/DL (ref 74–99)
HCT VFR BLD AUTO: 41.1 % (ref 36–46)
HGB BLD-MCNC: 13.8 G/DL (ref 12–16)
IMM GRANULOCYTES # BLD AUTO: 0.02 X10*3/UL (ref 0–0.7)
IMM GRANULOCYTES NFR BLD AUTO: 0.3 % (ref 0–0.9)
IRON SATN MFR SERPL: 23 % (ref 25–45)
IRON SERPL-MCNC: 58 UG/DL (ref 35–150)
LYMPHOCYTES # BLD AUTO: 2.22 X10*3/UL (ref 1.2–4.8)
LYMPHOCYTES NFR BLD AUTO: 28.8 %
MCH RBC QN AUTO: 28.6 PG (ref 26–34)
MCHC RBC AUTO-ENTMCNC: 33.6 G/DL (ref 32–36)
MCV RBC AUTO: 85 FL (ref 80–100)
MONOCYTES # BLD AUTO: 0.48 X10*3/UL (ref 0.1–1)
MONOCYTES NFR BLD AUTO: 6.2 %
NEUTROPHILS # BLD AUTO: 4.62 X10*3/UL (ref 1.2–7.7)
NEUTROPHILS NFR BLD AUTO: 59.8 %
NRBC BLD-RTO: 0 /100 WBCS (ref 0–0)
PLATELET # BLD AUTO: 309 X10*3/UL (ref 150–450)
POTASSIUM SERPL-SCNC: 3.8 MMOL/L (ref 3.5–5.3)
PROT SERPL-MCNC: 7.5 G/DL (ref 6.4–8.2)
RBC # BLD AUTO: 4.83 X10*6/UL (ref 4–5.2)
SODIUM SERPL-SCNC: 139 MMOL/L (ref 136–145)
TIBC SERPL-MCNC: 255 UG/DL (ref 240–445)
UIBC SERPL-MCNC: 197 UG/DL (ref 110–370)
VIT B12 SERPL-MCNC: 938 PG/ML (ref 211–911)
WBC # BLD AUTO: 7.7 X10*3/UL (ref 4.4–11.3)

## 2025-04-09 PROCEDURE — 85025 COMPLETE CBC W/AUTO DIFF WBC: CPT

## 2025-04-09 PROCEDURE — 80053 COMPREHEN METABOLIC PANEL: CPT

## 2025-04-09 PROCEDURE — 99213 OFFICE O/P EST LOW 20 MIN: CPT | Performed by: INTERNAL MEDICINE

## 2025-04-09 PROCEDURE — 83550 IRON BINDING TEST: CPT

## 2025-04-09 PROCEDURE — 83540 ASSAY OF IRON: CPT

## 2025-04-09 PROCEDURE — 82607 VITAMIN B-12: CPT

## 2025-04-09 PROCEDURE — 82728 ASSAY OF FERRITIN: CPT

## 2025-04-09 ASSESSMENT — PAIN SCALES - GENERAL: PAINLEVEL_OUTOF10: 0-NO PAIN

## 2025-04-09 NOTE — PROGRESS NOTES
Attestation signed by Joshua Lea MD on 4/9/25 at 12:08 PM.    I, Dr Joshua Lea, have reviewed this progress note, medication list, vital signs, any pertinent lab values, and any CGM data if present with the Certified [facility] and  face to face during this visit today. This note reflects the treatment plan that was made under my direction after reviewing the above mentioned elements while face to face with the patient and CDE.  I personally answered and addressed any questions and concerns the patient had during the visit today.  The CDE entered the data in this note under my direction and I personally reviewed it, signed any lab or medication orders that I instructed to be completed. I am the billing provider for this visit and the level of service was determined by my involvement in the Medical Decision Making Component of this visit while face to face with the patient.

## 2025-04-22 DIAGNOSIS — E06.3 HASHIMOTO'S THYROIDITIS: ICD-10-CM

## 2025-04-22 RX ORDER — LEVOTHYROXINE SODIUM 100 UG/1
100 TABLET ORAL DAILY
Qty: 30 TABLET | Refills: 5 | Status: SHIPPED | OUTPATIENT
Start: 2025-04-22 | End: 2026-04-22

## 2025-04-30 ENCOUNTER — APPOINTMENT (OUTPATIENT)
Dept: HEMATOLOGY/ONCOLOGY | Facility: HOSPITAL | Age: 25
End: 2025-04-30
Payer: COMMERCIAL

## 2025-05-01 ENCOUNTER — TELEMEDICINE (OUTPATIENT)
Dept: HEMATOLOGY/ONCOLOGY | Facility: CLINIC | Age: 25
End: 2025-05-01
Payer: COMMERCIAL

## 2025-05-01 DIAGNOSIS — D50.8 IRON DEFICIENCY ANEMIA SECONDARY TO INADEQUATE DIETARY IRON INTAKE: Primary | ICD-10-CM

## 2025-05-01 DIAGNOSIS — K90.49 MALABSORPTION DUE TO INTOLERANCE, NOT ELSEWHERE CLASSIFIED: ICD-10-CM

## 2025-05-01 PROCEDURE — 99214 OFFICE O/P EST MOD 30 MIN: CPT | Performed by: INTERNAL MEDICINE

## 2025-05-01 ASSESSMENT — ENCOUNTER SYMPTOMS
RESPIRATORY NEGATIVE: 1
FATIGUE: 1
UNEXPECTED WEIGHT CHANGE: 0
CARDIOVASCULAR NEGATIVE: 1
FEVER: 0
GASTROINTESTINAL NEGATIVE: 1

## 2025-05-01 NOTE — PROGRESS NOTES
Patient ID: Elizabeth Toro is a 25 y.o. female.    Subjective:  Telemed visit for follow up for iron deficiency anemia.   Has known autoimmune/pernicious anemia + Hashimoto's + heavy periods. She received venofer 200 mg iv x 5 doses. Was feeling very well until a few weeks ago. Now tired again.     Assessment/Plan:  ? Iron deficiency: Due to autoimmune gastritis. Has required iv venofer in June and Oct 2024, and now in Feb 2025 x 5 doses.     She will require IV iron lifelong. We will plan to check her labs every 2 months including next week. Will try to treat her iron deficiency proactively before her iron levels drop too much. I hope venofer 300 mg every 2 months would suffice.    She also needs b12 monthly due to pernicious anemia. Will repeat her labs in 6 months.     Review Of Systems:  Review of Systems   Constitutional:  Positive for fatigue. Negative for fever and unexpected weight change.   HENT:  Negative.     Respiratory: Negative.     Cardiovascular: Negative.    Gastrointestinal: Negative.        Physical Exam:  There were no vitals taken for this visit.  BSA: There is no height or weight on file to calculate BSA.  Performance Status: Asymptomatic  Physical Exam  HENT:      Head: Normocephalic and atraumatic.   Eyes:      General: No scleral icterus.  Pulmonary:      Effort: Pulmonary effort is normal.   Musculoskeletal:         General: Normal range of motion.   Skin:     Coloration: Skin is not jaundiced.   Neurological:      General: No focal deficit present.      Mental Status: She is alert and oriented to person, place, and time.         Results:  Diagnostic Results   Lab Results   Component Value Date    WBC 7.7 04/09/2025    HGB 13.8 04/09/2025    HCT 41.1 04/09/2025    MCV 85 04/09/2025     04/09/2025     Lab Results   Component Value Date    CALCIUM 9.7 04/09/2025     04/09/2025    K 3.8 04/09/2025    CO2 28 04/09/2025     04/09/2025    BUN 9 04/09/2025    CREATININE 0.64  04/09/2025    ALT 10 04/09/2025    AST 12 04/09/2025       Current Outpatient Medications:     calcium polycarbophiL (Fibercon) 625 mg tablet, Take 1 tablet (625 mg) by mouth once daily. Take one pill once a day and go up by one or down by one as needed for constipation, Disp: 60 tablet, Rfl: 0    cetirizine (ZyrTEC) 10 mg tablet, Take 1 tablet (10 mg) by mouth once daily. (Patient not taking: Reported on 4/9/2025), Disp: , Rfl:     copper gluconate 2 mg tablet, Take 2 mg by mouth once daily., Disp: , Rfl:     cyanocobalamin, vitamin B-12, 1,000 mcg/mL kit, Inject 1,000 mcg as directed every 28 (twenty-eight) days., Disp: 1 kit, Rfl: 11    fluticasone (Flonase) 50 mcg/actuation nasal spray, Administer 2 sprays into each nostril once every 24 hours., Disp: , Rfl:     hydrOXYzine HCL (Atarax) 50 mg tablet, Take 1 tablet (50 mg) by mouth if needed., Disp: , Rfl:     levothyroxine (Synthroid, Levoxyl) 100 mcg tablet, TAKE 1 TABLET (100 MCG) BY MOUTH EARLY IN THE MORNING.. TAKE ON AN EMPTY STOMACH AT THE SAME TIME EACH DAY, EITHER 30 TO 60 MINUTES PRIOR TO BREAKFAST, Disp: 30 tablet, Rfl: 5    ondansetron (Zofran) 4 mg tablet, Take 1 tablet (4 mg) by mouth every 6 hours., Disp: , Rfl:     pantoprazole (ProtoNix) 20 mg EC tablet, Take 1 tablet (20 mg) by mouth once daily in the morning. Take before meals. Do not crush, chew, or split., Disp: , Rfl:     sertraline (Zoloft) 25 mg tablet, Take 1 tablet (25 mg) by mouth once daily., Disp: , Rfl:     Sucraid 8,500 unit/mL solution, , Disp: , Rfl:     traZODone (Desyrel) 50 mg tablet, Take 1 tablet (50 mg) by mouth once daily at bedtime., Disp: , Rfl:     valACYclovir (Valtrex) 500 mg tablet, Take 1 tablet (500 mg) by mouth if needed., Disp: , Rfl:      No past surgical history on file.  Family History   Problem Relation Name Age of Onset    Colon cancer Maternal Grandfather Lewis     Prostate cancer Maternal Grandfather Lewis     Diabetes Maternal Grandmother Leeanne Javier  cancer Maternal Grandmother Leeanne     Multiple sclerosis Father's Sister Smita     Thyroid disease Father's Brother Joshua       reports that she has never smoked. She has never been exposed to tobacco smoke. She has never used smokeless tobacco.    Diagnoses and all orders for this visit:  Iron deficiency anemia secondary to inadequate dietary iron intake  -     CBC and Auto Differential; Standing  -     Ferritin; Standing  -     Iron and TIBC; Standing  -     Clinic Appointment Request; Future  -     CBC and Auto Differential; Future  -     Comprehensive Metabolic Panel; Future  -     Vitamin B12; Future  -     Ferritin; Future  -     Folate; Future  -     Iron and TIBC; Future  Malabsorption due to intolerance, not elsewhere classified  -     CBC and Auto Differential; Standing  -     Ferritin; Standing  -     Iron and TIBC; Standing  -     Clinic Appointment Request; Future  -     CBC and Auto Differential; Future  -     Comprehensive Metabolic Panel; Future  -     Vitamin B12; Future  -     Ferritin; Future  -     Folate; Future  -     Iron and TIBC; Future       Morgan Mendez MD

## 2025-05-05 DIAGNOSIS — E06.3 HASHIMOTO'S THYROIDITIS: Primary | ICD-10-CM

## 2025-05-07 ENCOUNTER — LAB (OUTPATIENT)
Dept: LAB | Facility: HOSPITAL | Age: 25
End: 2025-05-07
Payer: COMMERCIAL

## 2025-05-07 DIAGNOSIS — D50.8 OTHER IRON DEFICIENCY ANEMIAS: Primary | ICD-10-CM

## 2025-05-07 LAB
BASOPHILS # BLD AUTO: 0.06 X10*3/UL (ref 0–0.1)
BASOPHILS NFR BLD AUTO: 0.7 %
EOSINOPHIL # BLD AUTO: 0.3 X10*3/UL (ref 0–0.7)
EOSINOPHIL NFR BLD AUTO: 3.7 %
ERYTHROCYTE [DISTWIDTH] IN BLOOD BY AUTOMATED COUNT: 12.2 % (ref 11.5–14.5)
FERRITIN SERPL-MCNC: 165 NG/ML (ref 8–150)
HCT VFR BLD AUTO: 42.1 % (ref 36–46)
HGB BLD-MCNC: 13.8 G/DL (ref 12–16)
IMM GRANULOCYTES # BLD AUTO: 0.03 X10*3/UL (ref 0–0.7)
IMM GRANULOCYTES NFR BLD AUTO: 0.4 % (ref 0–0.9)
IRON SATN MFR SERPL: 26 % (ref 25–45)
IRON SERPL-MCNC: 68 UG/DL (ref 35–150)
LYMPHOCYTES # BLD AUTO: 1.95 X10*3/UL (ref 1.2–4.8)
LYMPHOCYTES NFR BLD AUTO: 23.8 %
MCH RBC QN AUTO: 28.2 PG (ref 26–34)
MCHC RBC AUTO-ENTMCNC: 32.8 G/DL (ref 32–36)
MCV RBC AUTO: 86 FL (ref 80–100)
MONOCYTES # BLD AUTO: 0.43 X10*3/UL (ref 0.1–1)
MONOCYTES NFR BLD AUTO: 5.2 %
NEUTROPHILS # BLD AUTO: 5.44 X10*3/UL (ref 1.2–7.7)
NEUTROPHILS NFR BLD AUTO: 66.2 %
NRBC BLD-RTO: 0 /100 WBCS (ref 0–0)
PLATELET # BLD AUTO: 300 X10*3/UL (ref 150–450)
RBC # BLD AUTO: 4.9 X10*6/UL (ref 4–5.2)
TIBC SERPL-MCNC: 266 UG/DL (ref 240–445)
UIBC SERPL-MCNC: 198 UG/DL (ref 110–370)
WBC # BLD AUTO: 8.2 X10*3/UL (ref 4.4–11.3)

## 2025-05-07 PROCEDURE — 36415 COLL VENOUS BLD VENIPUNCTURE: CPT

## 2025-05-07 PROCEDURE — 85025 COMPLETE CBC W/AUTO DIFF WBC: CPT

## 2025-05-07 PROCEDURE — 82728 ASSAY OF FERRITIN: CPT

## 2025-05-08 LAB
T3FREE SERPL-MCNC: 2.9 PG/ML (ref 2.3–4.2)
T4 FREE SERPL-MCNC: 1.2 NG/DL (ref 0.8–1.8)
TSH SERPL-ACNC: 1.14 MIU/L

## 2025-05-09 ENCOUNTER — INFUSION (OUTPATIENT)
Facility: HOSPITAL | Age: 25
End: 2025-05-09
Payer: COMMERCIAL

## 2025-05-09 ENCOUNTER — APPOINTMENT (OUTPATIENT)
Dept: HEMATOLOGY/ONCOLOGY | Facility: HOSPITAL | Age: 25
End: 2025-05-09
Payer: COMMERCIAL

## 2025-05-09 VITALS
RESPIRATION RATE: 16 BRPM | DIASTOLIC BLOOD PRESSURE: 74 MMHG | HEART RATE: 80 BPM | OXYGEN SATURATION: 98 % | TEMPERATURE: 97.9 F | SYSTOLIC BLOOD PRESSURE: 119 MMHG

## 2025-05-09 DIAGNOSIS — K90.49 MALABSORPTION DUE TO INTOLERANCE, NOT ELSEWHERE CLASSIFIED: ICD-10-CM

## 2025-05-09 DIAGNOSIS — D50.8 IRON DEFICIENCY ANEMIA SECONDARY TO INADEQUATE DIETARY IRON INTAKE: ICD-10-CM

## 2025-05-09 DIAGNOSIS — K29.40 AUTOIMMUNE GASTRITIS: ICD-10-CM

## 2025-05-09 PROCEDURE — 2500000004 HC RX 250 GENERAL PHARMACY W/ HCPCS (ALT 636 FOR OP/ED): Mod: JZ | Performed by: INTERNAL MEDICINE

## 2025-05-09 RX ORDER — EPINEPHRINE 0.3 MG/.3ML
0.3 INJECTION SUBCUTANEOUS EVERY 5 MIN PRN
OUTPATIENT
Start: 2025-06-04

## 2025-05-09 RX ORDER — HEPARIN SODIUM,PORCINE/PF 10 UNIT/ML
50 SYRINGE (ML) INTRAVENOUS AS NEEDED
OUTPATIENT
Start: 2025-05-09

## 2025-05-09 RX ORDER — DIPHENHYDRAMINE HYDROCHLORIDE 50 MG/ML
50 INJECTION, SOLUTION INTRAMUSCULAR; INTRAVENOUS AS NEEDED
OUTPATIENT
Start: 2025-06-04

## 2025-05-09 RX ORDER — HEPARIN 100 UNIT/ML
500 SYRINGE INTRAVENOUS AS NEEDED
OUTPATIENT
Start: 2025-05-09

## 2025-05-09 RX ORDER — ALBUTEROL SULFATE 0.83 MG/ML
3 SOLUTION RESPIRATORY (INHALATION) AS NEEDED
OUTPATIENT
Start: 2025-06-04

## 2025-05-09 RX ORDER — FAMOTIDINE 10 MG/ML
20 INJECTION, SOLUTION INTRAVENOUS ONCE AS NEEDED
OUTPATIENT
Start: 2025-06-04

## 2025-05-09 RX ADMIN — IRON SUCROSE 300 MG: 20 INJECTION, SOLUTION INTRAVENOUS at 12:48

## 2025-05-09 SDOH — ECONOMIC STABILITY: FOOD INSECURITY: WITHIN THE PAST 12 MONTHS, YOU WORRIED THAT YOUR FOOD WOULD RUN OUT BEFORE YOU GOT MONEY TO BUY MORE.: SOMETIMES TRUE

## 2025-05-09 SDOH — ECONOMIC STABILITY: FOOD INSECURITY: WITHIN THE PAST 12 MONTHS, THE FOOD YOU BOUGHT JUST DIDN'T LAST AND YOU DIDN'T HAVE MONEY TO GET MORE.: SOMETIMES TRUE

## 2025-05-09 ASSESSMENT — PAIN SCALES - GENERAL: PAINLEVEL_OUTOF10: 0-NO PAIN

## 2025-05-09 ASSESSMENT — ENCOUNTER SYMPTOMS
GASTROINTESTINAL NEGATIVE: 1
CARDIOVASCULAR NEGATIVE: 1
FATIGUE: 1
RESPIRATORY NEGATIVE: 1

## 2025-05-09 NOTE — PROGRESS NOTES
[facility]   Infusion Clinic Note   Date: May 9, 2025   Name: Elizabeth Toro  : 2000   MRN: [id number]         Reason for Visit: OP Infusion (Iron infusion)         Today: Elizabeth Toro had no medications administered during this visit.       Ordered By: Morgan Mendez MD       For a Diagnosis of: Autoimmune gastritis    Iron deficiency anemia secondary to inadequate dietary iron intake       At today's visit patient accompanied by: Self      Today's Vitals:   There were no vitals filed for this visit.          Pre - Treatment Checklist:      - Previous reaction to current treatment: no      (Assess patient for the concerns below. Document provider notification as appropriate).  - Active or recent infection with/without current antibiotic use: no  - Recent or planned invasive dental work: no  - Recent or planned surgeries: no  - Recently received or plans to receive vaccinations: no  - Has treatment related toxicities: no  - Any chance may be pregnant:  n/a      Pain: 0   - Is the pain different from normal: no   - Is prescribing Doctor aware:  n/a      Labs: Reviewed       Fall Risk Screening:         Review Of Systems:  Review of Systems   Constitutional:  Positive for fatigue.   Respiratory: Negative.     Cardiovascular: Negative.    Gastrointestinal: Negative.          Infusion Readiness:  - Assessment Concerns Related to Infusion: No  - Provider notified: no      New Patient Education:    N/A (returning patient for continuation of therapy. Ongoing education provided as needed.)        Treatment Conditions & Drug Specific Questions:    Iron Sucrose (VENOFER)     (Unless otherwise specified on patient specific therapy plan):    REMINDERS:  May cause transient hypotension. Supine position recommended for infusion.   Pregnancy test prior to first dose for patients of childbearing age.    Recommended Vitals/Observation:  Vitals: Obtain vital signs before and after each  infusion.  Observation: 30 minutes after the infusion is complete. Observation complete.      Lab Results   Component Value Date    IRON 68 05/07/2025    TIBC 266 05/07/2025    FERRITIN 165 (H) 05/07/2025     Lab Results   Component Value Date    IRONSAT 26 05/07/2025      Lab Results   Component Value Date    WBC 8.2 05/07/2025    HGB 13.8 05/07/2025    HCT 42.1 05/07/2025    MCV 86 05/07/2025     05/07/2025            Weight Based Drug Calculations:    WEIGHT BASED DRUGS: NOT APPLICABLE / FLAT DOSE       Post Treatment: Patient tolerated treatment without issue and was discharged in no apparent distress.      Note Authored / Patient Cared for By: Michelle Sorto RN

## 2025-05-11 ENCOUNTER — APPOINTMENT (OUTPATIENT)
Dept: RADIOLOGY | Facility: HOSPITAL | Age: 25
End: 2025-05-11
Payer: COMMERCIAL

## 2025-05-11 ENCOUNTER — HOSPITAL ENCOUNTER (EMERGENCY)
Facility: HOSPITAL | Age: 25
Discharge: HOME | End: 2025-05-11
Payer: COMMERCIAL

## 2025-05-11 ENCOUNTER — APPOINTMENT (OUTPATIENT)
Dept: CARDIOLOGY | Facility: HOSPITAL | Age: 25
End: 2025-05-11
Payer: COMMERCIAL

## 2025-05-11 VITALS
HEIGHT: 66 IN | BODY MASS INDEX: 28.93 KG/M2 | HEART RATE: 75 BPM | SYSTOLIC BLOOD PRESSURE: 136 MMHG | WEIGHT: 180 LBS | TEMPERATURE: 97.5 F | OXYGEN SATURATION: 98 % | RESPIRATION RATE: 17 BRPM | DIASTOLIC BLOOD PRESSURE: 80 MMHG

## 2025-05-11 DIAGNOSIS — R68.89 DOES NOT FEEL RIGHT: Primary | ICD-10-CM

## 2025-05-11 DIAGNOSIS — E86.0 DEHYDRATION: ICD-10-CM

## 2025-05-11 LAB
ALBUMIN SERPL BCP-MCNC: 5.1 G/DL (ref 3.4–5)
ALP SERPL-CCNC: 73 U/L (ref 33–110)
ALT SERPL W P-5'-P-CCNC: 9 U/L (ref 7–45)
ANION GAP SERPL CALC-SCNC: 14 MMOL/L (ref 10–20)
APPEARANCE UR: CLEAR
AST SERPL W P-5'-P-CCNC: 11 U/L (ref 9–39)
BASOPHILS # BLD AUTO: 0.07 X10*3/UL (ref 0–0.1)
BASOPHILS NFR BLD AUTO: 0.6 %
BILIRUB SERPL-MCNC: 0.5 MG/DL (ref 0–1.2)
BILIRUB UR STRIP.AUTO-MCNC: NEGATIVE MG/DL
BUN SERPL-MCNC: 9 MG/DL (ref 6–23)
CALCIUM SERPL-MCNC: 9.3 MG/DL (ref 8.6–10.3)
CHLORIDE SERPL-SCNC: 103 MMOL/L (ref 98–107)
CO2 SERPL-SCNC: 26 MMOL/L (ref 21–32)
COLOR UR: ABNORMAL
CREAT SERPL-MCNC: 0.75 MG/DL (ref 0.5–1.05)
EGFRCR SERPLBLD CKD-EPI 2021: >90 ML/MIN/1.73M*2
EOSINOPHIL # BLD AUTO: 0.44 X10*3/UL (ref 0–0.7)
EOSINOPHIL NFR BLD AUTO: 3.8 %
ERYTHROCYTE [DISTWIDTH] IN BLOOD BY AUTOMATED COUNT: 12.2 % (ref 11.5–14.5)
FLUAV RNA RESP QL NAA+PROBE: NOT DETECTED
FLUBV RNA RESP QL NAA+PROBE: NOT DETECTED
GLUCOSE SERPL-MCNC: 85 MG/DL (ref 74–99)
GLUCOSE UR STRIP.AUTO-MCNC: NORMAL MG/DL
HCG UR QL IA.RAPID: NEGATIVE
HCT VFR BLD AUTO: 44.3 % (ref 36–46)
HETEROPH AB SERPLBLD QL IA.RAPID: NEGATIVE
HGB BLD-MCNC: 15 G/DL (ref 12–16)
IMM GRANULOCYTES # BLD AUTO: 0.04 X10*3/UL (ref 0–0.7)
IMM GRANULOCYTES NFR BLD AUTO: 0.3 % (ref 0–0.9)
KETONES UR STRIP.AUTO-MCNC: NEGATIVE MG/DL
LACTATE SERPL-SCNC: 1.2 MMOL/L (ref 0.4–2)
LEUKOCYTE ESTERASE UR QL STRIP.AUTO: NEGATIVE
LYMPHOCYTES # BLD AUTO: 2.24 X10*3/UL (ref 1.2–4.8)
LYMPHOCYTES NFR BLD AUTO: 19.2 %
MAGNESIUM SERPL-MCNC: 1.92 MG/DL (ref 1.6–2.4)
MCH RBC QN AUTO: 29.2 PG (ref 26–34)
MCHC RBC AUTO-ENTMCNC: 33.9 G/DL (ref 32–36)
MCV RBC AUTO: 86 FL (ref 80–100)
MONOCYTES # BLD AUTO: 0.68 X10*3/UL (ref 0.1–1)
MONOCYTES NFR BLD AUTO: 5.8 %
MUCOUS THREADS #/AREA URNS AUTO: NORMAL /LPF
NEUTROPHILS # BLD AUTO: 8.18 X10*3/UL (ref 1.2–7.7)
NEUTROPHILS NFR BLD AUTO: 70.3 %
NITRITE UR QL STRIP.AUTO: NEGATIVE
NRBC BLD-RTO: 0 /100 WBCS (ref 0–0)
PH UR STRIP.AUTO: 6.5 [PH]
PHOSPHATE SERPL-MCNC: 3.6 MG/DL (ref 2.5–4.9)
PLATELET # BLD AUTO: 337 X10*3/UL (ref 150–450)
POTASSIUM SERPL-SCNC: 3.7 MMOL/L (ref 3.5–5.3)
PROT SERPL-MCNC: 8 G/DL (ref 6.4–8.2)
PROT UR STRIP.AUTO-MCNC: NEGATIVE MG/DL
RBC # BLD AUTO: 5.14 X10*6/UL (ref 4–5.2)
RBC # UR STRIP.AUTO: ABNORMAL MG/DL
RBC #/AREA URNS AUTO: NORMAL /HPF
RSV RNA RESP QL NAA+PROBE: NOT DETECTED
SARS-COV-2 RNA RESP QL NAA+PROBE: NOT DETECTED
SODIUM SERPL-SCNC: 139 MMOL/L (ref 136–145)
SP GR UR STRIP.AUTO: 1.02
SQUAMOUS #/AREA URNS AUTO: NORMAL /HPF
TSH SERPL-ACNC: 0.71 MIU/L (ref 0.44–3.98)
UROBILINOGEN UR STRIP.AUTO-MCNC: NORMAL MG/DL
WBC # BLD AUTO: 11.7 X10*3/UL (ref 4.4–11.3)
WBC #/AREA URNS AUTO: NORMAL /HPF

## 2025-05-11 PROCEDURE — 85025 COMPLETE CBC W/AUTO DIFF WBC: CPT | Performed by: NURSE PRACTITIONER

## 2025-05-11 PROCEDURE — 36415 COLL VENOUS BLD VENIPUNCTURE: CPT | Performed by: NURSE PRACTITIONER

## 2025-05-11 PROCEDURE — 81001 URINALYSIS AUTO W/SCOPE: CPT | Performed by: NURSE PRACTITIONER

## 2025-05-11 PROCEDURE — 87637 SARSCOV2&INF A&B&RSV AMP PRB: CPT | Performed by: NURSE PRACTITIONER

## 2025-05-11 PROCEDURE — 83605 ASSAY OF LACTIC ACID: CPT | Performed by: NURSE PRACTITIONER

## 2025-05-11 PROCEDURE — 81025 URINE PREGNANCY TEST: CPT | Performed by: NURSE PRACTITIONER

## 2025-05-11 PROCEDURE — 96360 HYDRATION IV INFUSION INIT: CPT

## 2025-05-11 PROCEDURE — 2500000004 HC RX 250 GENERAL PHARMACY W/ HCPCS (ALT 636 FOR OP/ED): Mod: JZ | Performed by: NURSE PRACTITIONER

## 2025-05-11 PROCEDURE — 80053 COMPREHEN METABOLIC PANEL: CPT | Performed by: NURSE PRACTITIONER

## 2025-05-11 PROCEDURE — 96361 HYDRATE IV INFUSION ADD-ON: CPT

## 2025-05-11 PROCEDURE — 83735 ASSAY OF MAGNESIUM: CPT | Performed by: NURSE PRACTITIONER

## 2025-05-11 PROCEDURE — 93005 ELECTROCARDIOGRAM TRACING: CPT

## 2025-05-11 PROCEDURE — 84443 ASSAY THYROID STIM HORMONE: CPT | Performed by: NURSE PRACTITIONER

## 2025-05-11 PROCEDURE — 84100 ASSAY OF PHOSPHORUS: CPT | Performed by: NURSE PRACTITIONER

## 2025-05-11 PROCEDURE — 86308 HETEROPHILE ANTIBODY SCREEN: CPT | Performed by: NURSE PRACTITIONER

## 2025-05-11 PROCEDURE — 99284 EMERGENCY DEPT VISIT MOD MDM: CPT | Mod: 25

## 2025-05-11 RX ADMIN — SODIUM CHLORIDE 1000 ML: 9 INJECTION, SOLUTION INTRAVENOUS at 16:53

## 2025-05-11 RX ADMIN — SODIUM CHLORIDE, POTASSIUM CHLORIDE, SODIUM LACTATE AND CALCIUM CHLORIDE 1000 ML: 600; 310; 30; 20 INJECTION, SOLUTION INTRAVENOUS at 15:23

## 2025-05-11 ASSESSMENT — LIFESTYLE VARIABLES
HAVE PEOPLE ANNOYED YOU BY CRITICIZING YOUR DRINKING: NO
TOTAL SCORE: 0
EVER HAD A DRINK FIRST THING IN THE MORNING TO STEADY YOUR NERVES TO GET RID OF A HANGOVER: NO
EVER FELT BAD OR GUILTY ABOUT YOUR DRINKING: NO
HAVE YOU EVER FELT YOU SHOULD CUT DOWN ON YOUR DRINKING: NO

## 2025-05-11 ASSESSMENT — PAIN - FUNCTIONAL ASSESSMENT: PAIN_FUNCTIONAL_ASSESSMENT: 0-10

## 2025-05-11 ASSESSMENT — COLUMBIA-SUICIDE SEVERITY RATING SCALE - C-SSRS
1. IN THE PAST MONTH, HAVE YOU WISHED YOU WERE DEAD OR WISHED YOU COULD GO TO SLEEP AND NOT WAKE UP?: NO
6. HAVE YOU EVER DONE ANYTHING, STARTED TO DO ANYTHING, OR PREPARED TO DO ANYTHING TO END YOUR LIFE?: NO
2. HAVE YOU ACTUALLY HAD ANY THOUGHTS OF KILLING YOURSELF?: NO

## 2025-05-11 ASSESSMENT — PAIN SCALES - GENERAL: PAINLEVEL_OUTOF10: 0 - NO PAIN

## 2025-05-11 NOTE — Clinical Note
Elizabeth Toro was seen and treated in our emergency department on 5/11/2025.  She may return to work on 05/14/2025.  Return tue or wed if better      If you have any questions or concerns, please don't hesitate to call.      Delia Conte, APRN-CNP

## 2025-05-11 NOTE — ED PROVIDER NOTES
[facility]  ED  Encounter Note  Admit Date/RoomTime: 2025  2:51 PM  ED Room: [place] [address]  NAME: Elizabeth Toro  : 2000  MRN: [id number]     Chief Complaint:  Diarrhea (On and off x 1 week. Recently on ATB for a upper respiratory infection concerned she got Cdiff. Endorses weakness and nausea. Denies pain or vomiting )    HISTORY OF PRESENT ILLNESS        Elizabeth Toro is a 25 y.o. female who presents to the ED for evaluation of feeling unwell dehydrated.    Patient stated that she was sick about a month ago with an URI and then placed on antibiotics which she finished two weeks ago.    She stated that she has never felt well. Feels dry dehydrated still coughing and has diarrhea. Was on Amoxil.    Also on PPI and carafate. Has autoimmune gastritis and gets iron infusions.    Stomach feels pain at times.  She denied any diarrhea today. She had two episodes sat and Friday. Appetite is fair.     ROS   Pertinent positives and negatives are stated within HPI, all other systems reviewed and are negative.    Past Medical History:  has a past medical history of Anemia, Autoimmune gastritis, Goiter, Hashimoto's thyroiditis, Hypothyroidism, Reactive hypoglycemia, Thyroid antibody positive, Thyroid nodule, Vitamin B 12 deficiency, and Vitamin D deficiency.    Surgical History:  has no past surgical history on file.    Social History:  reports that she has never smoked. She has never been exposed to tobacco smoke. She has never used smokeless tobacco. She reports that she does not currently use alcohol after a past usage of about 2.0 standard drinks of alcohol per week. She reports that she does not currently use drugs.    Family History: family history includes Breast cancer in her maternal grandmother; Colon cancer in her maternal grandfather; Diabetes in her maternal grandmother; Multiple sclerosis in her father's sister; Prostate cancer in her maternal grandfather; Thyroid  disease in her father's brother.     Allergies: House dust mite, Iodine, Lorazepam, Milk, and Morphine    PHYSICAL EXAM   Oxygen Saturation Interpretation: Normal.       Physical Exam  Constitutional/General: Alert and oriented x3, well appearing, non toxic  HEENT:  NC/NT. PERRLA.  Airway patent.  Neck: Supple, full ROM. No midline vertebral tenderness or crepitus.   Respiratory: Lung sounds clear to auscultation bilaterally. No wheezes, rhonchi or stridor. Not in respiratory distress.  CV:  Regular rate. Regular rhythm. No murmurs or rubs. 2+ distal pulses.  GI:  Abdomen soft, non-tender, non-distended. +BS. No rebound, guarding, or rigidity. No pulsatile masses.  Musculoskeletal: Moves all extremities x 4. Warm and well perfused. Capillary refill <3 seconds  Integument: Skin warm and dry. No rashes.   Neurologic: Alert and oriented with no focal deficits, symmetric strength 5/5 in the upper and lower extremities bilaterally.  Psychiatric: Normal affect.    Lab / Imaging Results   (All laboratory and radiology results have been personally reviewed by myself)  Labs:  Results for orders placed or performed during the hospital encounter of 05/11/25   CBC and Auto Differential    Collection Time: 05/11/25  3:19 PM   Result Value Ref Range    WBC 11.7 (H) 4.4 - 11.3 x10*3/uL    nRBC 0.0 0.0 - 0.0 /100 WBCs    RBC 5.14 4.00 - 5.20 x10*6/uL    Hemoglobin 15.0 12.0 - 16.0 g/dL    Hematocrit 44.3 36.0 - 46.0 %    MCV 86 80 - 100 fL    MCH 29.2 26.0 - 34.0 pg    MCHC 33.9 32.0 - 36.0 g/dL    RDW 12.2 11.5 - 14.5 %    Platelets 337 150 - 450 x10*3/uL    Neutrophils % 70.3 40.0 - 80.0 %    Immature Granulocytes %, Automated 0.3 0.0 - 0.9 %    Lymphocytes % 19.2 13.0 - 44.0 %    Monocytes % 5.8 2.0 - 10.0 %    Eosinophils % 3.8 0.0 - 6.0 %    Basophils % 0.6 0.0 - 2.0 %    Neutrophils Absolute 8.18 (H) 1.20 - 7.70 x10*3/uL    Immature Granulocytes Absolute, Automated 0.04 0.00 - 0.70 x10*3/uL    Lymphocytes Absolute 2.24 1.20 -  4.80 x10*3/uL    Monocytes Absolute 0.68 0.10 - 1.00 x10*3/uL    Eosinophils Absolute 0.44 0.00 - 0.70 x10*3/uL    Basophils Absolute 0.07 0.00 - 0.10 x10*3/uL   Magnesium    Collection Time: 05/11/25  3:19 PM   Result Value Ref Range    Magnesium 1.92 1.60 - 2.40 mg/dL   Comprehensive metabolic panel    Collection Time: 05/11/25  3:19 PM   Result Value Ref Range    Glucose 85 74 - 99 mg/dL    Sodium 139 136 - 145 mmol/L    Potassium 3.7 3.5 - 5.3 mmol/L    Chloride 103 98 - 107 mmol/L    Bicarbonate 26 21 - 32 mmol/L    Anion Gap 14 10 - 20 mmol/L    Urea Nitrogen 9 6 - 23 mg/dL    Creatinine 0.75 0.50 - 1.05 mg/dL    eGFR >90 >60 mL/min/1.73m*2    Calcium 9.3 8.6 - 10.3 mg/dL    Albumin 5.1 (H) 3.4 - 5.0 g/dL    Alkaline Phosphatase 73 33 - 110 U/L    Total Protein 8.0 6.4 - 8.2 g/dL    AST 11 9 - 39 U/L    Bilirubin, Total 0.5 0.0 - 1.2 mg/dL    ALT 9 7 - 45 U/L   Lactate    Collection Time: 05/11/25  3:19 PM   Result Value Ref Range    Lactate 1.2 0.4 - 2.0 mmol/L   TSH with reflex to Free T4 if abnormal    Collection Time: 05/11/25  3:19 PM   Result Value Ref Range    Thyroid Stimulating Hormone 0.71 0.44 - 3.98 mIU/L   Phosphorus    Collection Time: 05/11/25  3:19 PM   Result Value Ref Range    Phosphorus 3.6 2.5 - 4.9 mg/dL   Mononucleosis screen    Collection Time: 05/11/25  3:19 PM   Result Value Ref Range    Mononucleosis Screen Negative Negative   hCG, Urine, Qualitative    Collection Time: 05/11/25  3:20 PM   Result Value Ref Range    HCG, Urine NEGATIVE NEGATIVE   Urinalysis with Reflex Culture and Microscopic    Collection Time: 05/11/25  3:20 PM   Result Value Ref Range    Color, Urine Light-Yellow Light-Yellow, Yellow, Dark-Yellow    Appearance, Urine Clear Clear    Specific Gravity, Urine 1.019 1.005 - 1.035    pH, Urine 6.5 5.0, 5.5, 6.0, 6.5, 7.0, 7.5, 8.0    Protein, Urine NEGATIVE NEGATIVE, 10 (TRACE), 20 (TRACE) mg/dL    Glucose, Urine Normal Normal mg/dL    Blood, Urine 0.03 (TRACE) (A)  NEGATIVE mg/dL    Ketones, Urine NEGATIVE NEGATIVE mg/dL    Bilirubin, Urine NEGATIVE NEGATIVE mg/dL    Urobilinogen, Urine Normal Normal mg/dL    Nitrite, Urine NEGATIVE NEGATIVE    Leukocyte Esterase, Urine NEGATIVE NEGATIVE   Urinalysis Microscopic    Collection Time: 05/11/25  3:20 PM   Result Value Ref Range    WBC, Urine NONE 1-5, NONE /HPF    RBC, Urine 3-5 NONE, 1-2, 3-5 /HPF    Squamous Epithelial Cells, Urine 1-9 (SPARSE) Reference range not established. /HPF    Mucus, Urine FEW Reference range not established. /LPF   Sars-CoV-2, Influenza A/B and RSV PCR    Collection Time: 05/11/25  6:47 PM   Result Value Ref Range    Coronavirus 2019, PCR Not Detected Not Detected    Flu A Result Not Detected Not Detected    Flu B Result Not Detected Not Detected    RSV PCR Not Detected Not Detected     Imaging:  All Radiology results interpreted by Radiologist unless otherwise noted.  No orders to display       ED Course / Medical Decision Making     Medications   lactated Ringer's bolus 1,000 mL (0 mL intravenous Stopped 5/11/25 1624)   sodium chloride 0.9 % bolus 1,000 mL (0 mL intravenous Stopped 5/11/25 1812)     ED Course as of 05/11/25 2046   Sun May 11, 2025   1513 EKG rate: 78 bpm, pr interbval 154 ms qrs duration 78 ms qt qtc 356/405 ms prt axes 62 55 4 normal sinus rhythm axis rate personally reviewed by me [PK]   [id number] wanted to avoid ct scan abd pelvis at this time  [PK]      ED Course User Index  [PK] Delia Conte, APRN-CNP         Diagnoses as of 05/11/25 2046   Does not feel right   Dehydration       MDM:       Elizabeth Toro is a 25 y.o. female who presents to the ED for evaluation of feeling unwell dehydrated.    Patient stated that she was sick about a month ago with an URI and then placed on antibiotics which she finished two weeks ago.    She stated that she has never felt well. Feels dry dehydrated still coughing and has diarrhea. Was on Amoxil.    Also on PPI and carafate. Has  autoimmune gastritis and gets iron infusions.    Stomach feels pain at times.  She denied any diarrhea today. She had two episodes sat and Friday. Appetite is fair.     ED course stable  Labs stable cbc cmp urine covid flu rsv tsh mono spot  Received 2 liters of iv fluids.   Pt could not tolerate ct scan abd pelvis, felt anxious.   Unable to produce stool specimen was evaluated in the ED for 4.5 hours.  Pt denied still felt tired.   Discussed off work tomorrow, and seeing PCP and consider immunologist consult.  She does work 50+ hours a week. Tries to sleep and eat well.  Return if not improving or any new s/s.  Ddx: fatigue dehydration     Plan of Care/Counseling:  I reviewed today's visit with the patient  in addition to providing specific details for the plan of care and counseling regarding the diagnosis and prognosis.  Questions are answered at this time and are agreeable with the plan.    ASSESSMENT     1. Does not feel right    2. Dehydration      PLAN   Home Advised to return for signs of head injury, weakness, numbness or tingling to extremities, incontinence and Advised to return for worsening or additional problems such as abdominal or chest pain  Diagnostic tests were reviewed and questions answered. Diagnosis, care plan and treatment options were discussed. The patient and spouse/SO understand instructions and will follow up as directed.  Condition stable  The patient and spouse was given verbal follow-up instructions  Patient condition is stable    New Medications     New Medications Ordered This Visit   Medications    lactated Ringer's bolus 1,000 mL    sodium chloride 0.9 % bolus 1,000 mL     Electronically signed by GUADALUPE Cueva   **This report was transcribed using voice recognition software. Every effort was made to ensure accuracy; however, inadvertent computerized transcription errors may be present.  END OF ED PROVIDER NOTE     GUADALUPE Cueva  05/11/25 2050

## 2025-05-11 NOTE — ED TRIAGE NOTES
Patient here for diarrhea On and off x 1 week. Recently on ATB for a upper respiratory infection concerned she got Cdiff. Endorses weakness and nausea. Denies pain or vomiting

## 2025-05-12 LAB — HOLD SPECIMEN: NORMAL

## 2025-05-15 LAB
ATRIAL RATE: 78 BPM
P AXIS: 62 DEGREES
P OFFSET: 196 MS
P ONSET: 146 MS
PR INTERVAL: 154 MS
Q ONSET: 223 MS
QRS COUNT: 12 BEATS
QRS DURATION: 78 MS
QT INTERVAL: 356 MS
QTC CALCULATION(BAZETT): 405 MS
QTC FREDERICIA: 388 MS
R AXIS: 55 DEGREES
T AXIS: 40 DEGREES
T OFFSET: 401 MS
VENTRICULAR RATE: 78 BPM

## 2025-06-20 DIAGNOSIS — D50.8 IRON DEFICIENCY ANEMIA SECONDARY TO INADEQUATE DIETARY IRON INTAKE: ICD-10-CM

## 2025-06-20 DIAGNOSIS — K90.49 MALABSORPTION DUE TO INTOLERANCE, NOT ELSEWHERE CLASSIFIED: ICD-10-CM

## 2025-07-10 ENCOUNTER — LAB (OUTPATIENT)
Dept: LAB | Facility: HOSPITAL | Age: 25
End: 2025-07-10
Payer: COMMERCIAL

## 2025-07-10 ENCOUNTER — APPOINTMENT (OUTPATIENT)
Dept: LAB | Facility: HOSPITAL | Age: 25
End: 2025-07-10
Payer: COMMERCIAL

## 2025-07-10 DIAGNOSIS — K90.49 MALABSORPTION DUE TO INTOLERANCE, NOT ELSEWHERE CLASSIFIED: Primary | ICD-10-CM

## 2025-07-10 LAB
BASOPHILS # BLD AUTO: 0.06 X10*3/UL (ref 0–0.1)
BASOPHILS NFR BLD AUTO: 0.7 %
EOSINOPHIL # BLD AUTO: 0.3 X10*3/UL (ref 0–0.7)
EOSINOPHIL NFR BLD AUTO: 3.5 %
ERYTHROCYTE [DISTWIDTH] IN BLOOD BY AUTOMATED COUNT: 12 % (ref 11.5–14.5)
FERRITIN SERPL-MCNC: 177 NG/ML (ref 8–150)
HCT VFR BLD AUTO: 41.6 % (ref 36–46)
HGB BLD-MCNC: 13.9 G/DL (ref 12–16)
IMM GRANULOCYTES # BLD AUTO: 0.05 X10*3/UL (ref 0–0.7)
IMM GRANULOCYTES NFR BLD AUTO: 0.6 % (ref 0–0.9)
IRON SATN MFR SERPL: 22 % (ref 25–45)
IRON SERPL-MCNC: 61 UG/DL (ref 35–150)
LYMPHOCYTES # BLD AUTO: 1.91 X10*3/UL (ref 1.2–4.8)
LYMPHOCYTES NFR BLD AUTO: 22.2 %
MCH RBC QN AUTO: 29.3 PG (ref 26–34)
MCHC RBC AUTO-ENTMCNC: 33.4 G/DL (ref 32–36)
MCV RBC AUTO: 88 FL (ref 80–100)
MONOCYTES # BLD AUTO: 0.5 X10*3/UL (ref 0.1–1)
MONOCYTES NFR BLD AUTO: 5.8 %
NEUTROPHILS # BLD AUTO: 5.78 X10*3/UL (ref 1.2–7.7)
NEUTROPHILS NFR BLD AUTO: 67.2 %
NRBC BLD-RTO: 0 /100 WBCS (ref 0–0)
PLATELET # BLD AUTO: 291 X10*3/UL (ref 150–450)
RBC # BLD AUTO: 4.75 X10*6/UL (ref 4–5.2)
TIBC SERPL-MCNC: 272 UG/DL (ref 240–445)
UIBC SERPL-MCNC: 211 UG/DL (ref 110–370)
WBC # BLD AUTO: 8.6 X10*3/UL (ref 4.4–11.3)

## 2025-07-10 PROCEDURE — 82728 ASSAY OF FERRITIN: CPT | Performed by: INTERNAL MEDICINE

## 2025-07-10 PROCEDURE — 83550 IRON BINDING TEST: CPT | Performed by: INTERNAL MEDICINE

## 2025-07-10 PROCEDURE — 85025 COMPLETE CBC W/AUTO DIFF WBC: CPT | Performed by: INTERNAL MEDICINE

## 2025-07-11 ENCOUNTER — INFUSION (OUTPATIENT)
Facility: HOSPITAL | Age: 25
End: 2025-07-11
Payer: COMMERCIAL

## 2025-07-11 VITALS
RESPIRATION RATE: 16 BRPM | OXYGEN SATURATION: 99 % | DIASTOLIC BLOOD PRESSURE: 78 MMHG | HEART RATE: 72 BPM | TEMPERATURE: 96.8 F | SYSTOLIC BLOOD PRESSURE: 120 MMHG

## 2025-07-11 DIAGNOSIS — K29.40 AUTOIMMUNE GASTRITIS: ICD-10-CM

## 2025-07-11 DIAGNOSIS — D50.8 IRON DEFICIENCY ANEMIA SECONDARY TO INADEQUATE DIETARY IRON INTAKE: ICD-10-CM

## 2025-07-11 PROCEDURE — 2500000004 HC RX 250 GENERAL PHARMACY W/ HCPCS (ALT 636 FOR OP/ED): Performed by: INTERNAL MEDICINE

## 2025-07-11 RX ORDER — FAMOTIDINE 10 MG/ML
20 INJECTION, SOLUTION INTRAVENOUS ONCE AS NEEDED
OUTPATIENT
Start: 2025-08-04

## 2025-07-11 RX ORDER — ALBUTEROL SULFATE 0.83 MG/ML
3 SOLUTION RESPIRATORY (INHALATION) AS NEEDED
OUTPATIENT
Start: 2025-08-04

## 2025-07-11 RX ORDER — DIPHENHYDRAMINE HYDROCHLORIDE 50 MG/ML
50 INJECTION, SOLUTION INTRAMUSCULAR; INTRAVENOUS AS NEEDED
OUTPATIENT
Start: 2025-08-04

## 2025-07-11 RX ORDER — EPINEPHRINE 0.3 MG/.3ML
0.3 INJECTION SUBCUTANEOUS EVERY 5 MIN PRN
OUTPATIENT
Start: 2025-08-04

## 2025-07-11 RX ADMIN — IRON SUCROSE 300 MG: 20 INJECTION, SOLUTION INTRAVENOUS at 12:38

## 2025-07-11 ASSESSMENT — PAIN SCALES - GENERAL: PAINLEVEL_OUTOF10: 0-NO PAIN

## 2025-07-11 ASSESSMENT — ENCOUNTER SYMPTOMS: FATIGUE: 1

## 2025-07-11 NOTE — PROGRESS NOTES
[facility]   Infusion Clinic Note   Date: 2025   Name: Elizabeth Toro  : 2000   MRN: [id number]         Reason for Visit: Iron Deficiency and OP Infusion         Today: We administered iron sucrose (Venofer) 300 mg in sodium chloride 0.9% 282 mL IV.       Ordered By: Morgan Mendez MD       For a Diagnosis of: Autoimmune gastritis    Iron deficiency anemia secondary to inadequate dietary iron intake       At today's visit patient accompanied by: Parent      Today's Vitals:   Vitals:    25 1212   BP: 144/83   Pulse: 91   Resp: 16   Temp: 36.1 °C (97 °F)   TempSrc: Temporal   SpO2: 99%   PainSc: 0-No pain             Pre - Treatment Checklist:      - Previous reaction to current treatment: no      (Assess patient for the concerns below. Document provider notification as appropriate).  - Active or recent infection with/without current antibiotic use: n/a  - Recent or planned invasive dental work: n/a  - Recent or planned surgeries: n/a  - Recently received or plans to receive vaccinations: n/a  - Has treatment related toxicities: n/a  - Any chance may be pregnant:  n/a      Pain: 0   - Is the pain different from normal: n/a   - Is prescribing Doctor aware:  n/a      Labs: N/A      Fall Risk Screening: Gonzalez Fall Risk  History of Falling, Immediate or Within 3 Months: No  Secondary Diagnosis: No  Ambulatory Aid: Walks without aid/bedrest/nurse assist  Intravenous Therapy/Heparin Lock: Yes  Gait/Transferring: Normal/bedrest/immobile  Mental Status: Oriented to own ability  Gonzalez Fall Risk Score: 20       Review Of Systems:  Review of Systems   Constitutional:  Positive for fatigue.   All other systems reviewed and are negative.        Infusion Readiness:  - Assessment Concerns Related to Infusion: No  - Provider notified: n/a      New Patient Education:    N/A (returning patient for continuation of therapy. Ongoing education provided as needed.)        Treatment  Conditions & Drug Specific Questions:    Iron Sucrose (VENOFER)     (Unless otherwise specified on patient specific therapy plan):    REMINDERS:  May cause transient hypotension. Supine position recommended for infusion.   Pregnancy test prior to first dose for patients of childbearing age.    Recommended Vitals/Observation:  Vitals: Obtain vital signs before and after each infusion.  Observation: 30 minutes after the infusion is complete. Observation complete.      Lab Results   Component Value Date    IRON 61 07/10/2025    TIBC 272 07/10/2025    FERRITIN 177 (H) 07/10/2025     Lab Results   Component Value Date    IRONSAT 22 (L) 07/10/2025      Lab Results   Component Value Date    WBC 8.6 07/10/2025    HGB 13.9 07/10/2025    HCT 41.6 07/10/2025    MCV 88 07/10/2025     07/10/2025            Weight Based Drug Calculations:    WEIGHT BASED DRUGS: NOT APPLICABLE / FLAT DOSE       Post Treatment: Patient tolerated treatment without issue and was discharged in no apparent distress.      Note Authored / Patient Cared for By: Sis Shipley RN

## 2025-08-15 DIAGNOSIS — K90.49 MALABSORPTION DUE TO INTOLERANCE, NOT ELSEWHERE CLASSIFIED: ICD-10-CM

## 2025-08-15 DIAGNOSIS — D50.8 IRON DEFICIENCY ANEMIA SECONDARY TO INADEQUATE DIETARY IRON INTAKE: ICD-10-CM

## 2025-08-26 DIAGNOSIS — E06.3 HASHIMOTO'S THYROIDITIS: Primary | ICD-10-CM

## 2025-08-30 LAB — TSH SERPL-ACNC: 0.84 MIU/L

## 2025-09-02 ENCOUNTER — PATIENT MESSAGE (OUTPATIENT)
Dept: HEMATOLOGY/ONCOLOGY | Facility: HOSPITAL | Age: 25
End: 2025-09-02
Payer: COMMERCIAL

## 2025-09-02 DIAGNOSIS — K90.49 MALABSORPTION DUE TO INTOLERANCE, NOT ELSEWHERE CLASSIFIED: Primary | ICD-10-CM

## 2025-09-02 DIAGNOSIS — D50.8 IRON DEFICIENCY ANEMIA SECONDARY TO INADEQUATE DIETARY IRON INTAKE: ICD-10-CM

## 2025-09-10 ENCOUNTER — APPOINTMENT (OUTPATIENT)
Dept: HEMATOLOGY/ONCOLOGY | Facility: HOSPITAL | Age: 25
End: 2025-09-10
Payer: COMMERCIAL

## 2025-11-11 ENCOUNTER — APPOINTMENT (OUTPATIENT)
Facility: HOSPITAL | Age: 25
End: 2025-11-11
Payer: COMMERCIAL

## 2026-04-09 ENCOUNTER — APPOINTMENT (OUTPATIENT)
Dept: ENDOCRINOLOGY | Facility: CLINIC | Age: 26
End: 2026-04-09
Payer: COMMERCIAL